# Patient Record
Sex: MALE | Race: WHITE | NOT HISPANIC OR LATINO | URBAN - METROPOLITAN AREA
[De-identification: names, ages, dates, MRNs, and addresses within clinical notes are randomized per-mention and may not be internally consistent; named-entity substitution may affect disease eponyms.]

---

## 2022-10-13 ENCOUNTER — INPATIENT (INPATIENT)
Facility: HOSPITAL | Age: 77
LOS: 1 days | Discharge: NURSING, REHAB OR LONG-TERM CARE | End: 2022-10-15
Attending: INTERNAL MEDICINE | Admitting: INTERNAL MEDICINE
Payer: MEDICARE

## 2022-10-13 VITALS
OXYGEN SATURATION: 97 % | TEMPERATURE: 98 F | HEART RATE: 78 BPM | DIASTOLIC BLOOD PRESSURE: 72 MMHG | RESPIRATION RATE: 17 BRPM | WEIGHT: 199.96 LBS | SYSTOLIC BLOOD PRESSURE: 141 MMHG

## 2022-10-13 LAB
ALBUMIN SERPL ELPH-MCNC: 4 G/DL — SIGNIFICANT CHANGE UP (ref 3.5–5.2)
ALP SERPL-CCNC: 64 U/L — SIGNIFICANT CHANGE UP (ref 30–115)
ALT FLD-CCNC: 12 U/L — SIGNIFICANT CHANGE UP (ref 0–41)
ANION GAP SERPL CALC-SCNC: 15 MMOL/L — HIGH (ref 7–14)
AST SERPL-CCNC: 17 U/L — SIGNIFICANT CHANGE UP (ref 0–41)
BASOPHILS # BLD AUTO: 0.04 K/UL — SIGNIFICANT CHANGE UP (ref 0–0.2)
BASOPHILS NFR BLD AUTO: 0.3 % — SIGNIFICANT CHANGE UP (ref 0–1)
BILIRUB SERPL-MCNC: 0.9 MG/DL — SIGNIFICANT CHANGE UP (ref 0.2–1.2)
BUN SERPL-MCNC: 20 MG/DL — SIGNIFICANT CHANGE UP (ref 10–20)
CALCIUM SERPL-MCNC: 9.4 MG/DL — SIGNIFICANT CHANGE UP (ref 8.4–10.5)
CHLORIDE SERPL-SCNC: 98 MMOL/L — SIGNIFICANT CHANGE UP (ref 98–110)
CO2 SERPL-SCNC: 22 MMOL/L — SIGNIFICANT CHANGE UP (ref 17–32)
CREAT SERPL-MCNC: 0.9 MG/DL — SIGNIFICANT CHANGE UP (ref 0.7–1.5)
EGFR: 89 ML/MIN/1.73M2 — SIGNIFICANT CHANGE UP
EOSINOPHIL # BLD AUTO: 0.07 K/UL — SIGNIFICANT CHANGE UP (ref 0–0.7)
EOSINOPHIL NFR BLD AUTO: 0.6 % — SIGNIFICANT CHANGE UP (ref 0–8)
ERYTHROCYTE [SEDIMENTATION RATE] IN BLOOD: 8 MM/HR — SIGNIFICANT CHANGE UP (ref 0–10)
GLUCOSE SERPL-MCNC: 136 MG/DL — HIGH (ref 70–99)
HCT VFR BLD CALC: 46 % — SIGNIFICANT CHANGE UP (ref 42–52)
HGB BLD-MCNC: 16.3 G/DL — SIGNIFICANT CHANGE UP (ref 14–18)
IMM GRANULOCYTES NFR BLD AUTO: 0.3 % — SIGNIFICANT CHANGE UP (ref 0.1–0.3)
LYMPHOCYTES # BLD AUTO: 2.46 K/UL — SIGNIFICANT CHANGE UP (ref 1.2–3.4)
LYMPHOCYTES # BLD AUTO: 20.4 % — LOW (ref 20.5–51.1)
MCHC RBC-ENTMCNC: 33.1 PG — HIGH (ref 27–31)
MCHC RBC-ENTMCNC: 35.4 G/DL — SIGNIFICANT CHANGE UP (ref 32–37)
MCV RBC AUTO: 93.5 FL — SIGNIFICANT CHANGE UP (ref 80–94)
MONOCYTES # BLD AUTO: 0.99 K/UL — HIGH (ref 0.1–0.6)
MONOCYTES NFR BLD AUTO: 8.2 % — SIGNIFICANT CHANGE UP (ref 1.7–9.3)
NEUTROPHILS # BLD AUTO: 8.48 K/UL — HIGH (ref 1.4–6.5)
NEUTROPHILS NFR BLD AUTO: 70.2 % — SIGNIFICANT CHANGE UP (ref 42.2–75.2)
NRBC # BLD: 0 /100 WBCS — SIGNIFICANT CHANGE UP (ref 0–0)
PLATELET # BLD AUTO: 293 K/UL — SIGNIFICANT CHANGE UP (ref 130–400)
POTASSIUM SERPL-MCNC: 4.5 MMOL/L — SIGNIFICANT CHANGE UP (ref 3.5–5)
POTASSIUM SERPL-SCNC: 4.5 MMOL/L — SIGNIFICANT CHANGE UP (ref 3.5–5)
PROT SERPL-MCNC: 7.2 G/DL — SIGNIFICANT CHANGE UP (ref 6–8)
RBC # BLD: 4.92 M/UL — SIGNIFICANT CHANGE UP (ref 4.7–6.1)
RBC # FLD: 12.6 % — SIGNIFICANT CHANGE UP (ref 11.5–14.5)
SARS-COV-2 RNA SPEC QL NAA+PROBE: SIGNIFICANT CHANGE UP
SODIUM SERPL-SCNC: 135 MMOL/L — SIGNIFICANT CHANGE UP (ref 135–146)
WBC # BLD: 12.08 K/UL — HIGH (ref 4.8–10.8)
WBC # FLD AUTO: 12.08 K/UL — HIGH (ref 4.8–10.8)

## 2022-10-13 PROCEDURE — 73552 X-RAY EXAM OF FEMUR 2/>: CPT | Mod: 26,LT

## 2022-10-13 PROCEDURE — 93010 ELECTROCARDIOGRAM REPORT: CPT

## 2022-10-13 PROCEDURE — 99285 EMERGENCY DEPT VISIT HI MDM: CPT | Mod: FS

## 2022-10-13 PROCEDURE — 72170 X-RAY EXAM OF PELVIS: CPT | Mod: 26

## 2022-10-13 NOTE — ED PROVIDER NOTE - OBJECTIVE STATEMENT
77-year old male with no significant past medical history who presented with no evidence of left leg, left hip, and back pain.  Reports that symptoms started 5 days ago and the pain has been progressively getting worse.  Reports that he has not been able to get out of bed for the past few days.  Reports that he has been urinating in the cup by the bedside, so he does not have to get out of bed to use the bathroom.  Reports that he has not been eating or drinking well for the past couple of days because he has not been able to get out of bed.  Denies recent trauma or injury or fall.  Denies saddle anesthesia, loss of bladder/bowel control.  Denies fever, shortness of breath, chest pain, nausea, vomiting, abdominal pain, urinary symptoms, change in bowel movement.

## 2022-10-13 NOTE — ED PROVIDER NOTE - ATTENDING APP SHARED VISIT CONTRIBUTION OF CARE
I was present for and supervised the key and critical aspects of the procedures performed during the care of the patient. Patient is a 77-year-old male no past medical history presents for evaluation of left hip pain as well and his left lower back pain onset over the past several weeks worsening over the past 5 days denies any fevers chills denies any trauma states that he has been not able to ambulate secondary to pain in fact on further questioning  patient is urinating into bedside cup he has decreased p.o. intake for the past several days secondary to the lack of ability to ambulate denies any abdominal pain vomiting diarrhea he denies any skin changes    On physical exam patient is normocephalic atraumatic pupils equal round reactive light accommodation extraocular muscles intact oropharynx clear chest clear to auscultation bilaterally abdomen soft nontender nondistended flank no CVA tenderness noted extremities radial pulses 2+ no edema pedal pulse 2+ cap refills normal patient has tenderness to palpation particularly upon flexion of the left hip as well as external and internal rotation of the left hip no signs of trauma no bruising    Assessment plan we obtained x-rays of the hip which reveal 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

## 2022-10-13 NOTE — ED PROVIDER NOTE - PHYSICAL EXAMINATION
CONSTITUTIONAL: in no apparent distress.   HEAD: Normocephalic; atraumatic.   ENT: Hearing is intact with good acuity to spoken voice.  Patient is speaking clearly, not muffled and airway is intact.   RESPIRATORY: No signs of respiratory distress. Lung sounds are clear in all lobes bilaterally without rales, rhonchi, or wheezes.  CARDIOVASCULAR: Regular rate and rhythm.   GI: Abdomen is soft, non-tender, and without distention.   BACK: No evidence of trauma or deformity. No midline tenderness. No CVA tenderness bilaterally. Normal ROM.   PELVIS: No evidence of trauma or deformity. L pelvis tender to palpation.  MS: Normal appearance and ROM in all four extremities. No tenderness to palpation and distal pulses are normal. Sensation to the upper and lower extremities is normal bilaterally.   NEURO: A & O x 3. Normal speech.   PSYCHOLOGICAL: Appropriate mood and affect. Good judgement and insight.

## 2022-10-13 NOTE — ED PROVIDER NOTE - NS ED ROS FT
Constitutional: Negative for fever, chills  HENT: Negative for headache,  Eyes: Negative for eye pain, and vision change.  Cardiovascular: Negative for chest pain, and palpitation.  Respiratory: Negative for SOB, wheezing, cough and sputum production.  Gastrointestinal: Negative for nausea, vomiting, abdominal pain  Genitourinary: Negative for flank pain, dysuria, frequency, and hematuria.  Neurological: Negative for dizziness, syncope, and loss of consciousness.  Musculoskeletal: + L leg, L hip, and back pain. Negative for neck pain, and calf cramps.  Hematological: Does not bruise/bleed easily.

## 2022-10-13 NOTE — ED PROVIDER NOTE - PROGRESS NOTE DETAILS
Labs and xray unremarkable. Pt is not able to ambulate. Will admit for ambulatory dysfunction. Pt is aware of the plan and agrees. Pt has been endorsed to hospitalist.

## 2022-10-13 NOTE — ED PROVIDER NOTE - CLINICAL SUMMARY MEDICAL DECISION MAKING FREE TEXT BOX
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

## 2022-10-14 LAB
ALBUMIN SERPL ELPH-MCNC: 4.4 G/DL — SIGNIFICANT CHANGE UP (ref 3.5–5.2)
ALP SERPL-CCNC: 73 U/L — SIGNIFICANT CHANGE UP (ref 30–115)
ALT FLD-CCNC: 13 U/L — SIGNIFICANT CHANGE UP (ref 0–41)
ANION GAP SERPL CALC-SCNC: 11 MMOL/L — SIGNIFICANT CHANGE UP (ref 7–14)
AST SERPL-CCNC: 17 U/L — SIGNIFICANT CHANGE UP (ref 0–41)
BILIRUB SERPL-MCNC: 0.7 MG/DL — SIGNIFICANT CHANGE UP (ref 0.2–1.2)
BUN SERPL-MCNC: 26 MG/DL — HIGH (ref 10–20)
CALCIUM SERPL-MCNC: 9.8 MG/DL — SIGNIFICANT CHANGE UP (ref 8.4–10.5)
CHLORIDE SERPL-SCNC: 97 MMOL/L — LOW (ref 98–110)
CO2 SERPL-SCNC: 26 MMOL/L — SIGNIFICANT CHANGE UP (ref 17–32)
CREAT SERPL-MCNC: 0.9 MG/DL — SIGNIFICANT CHANGE UP (ref 0.7–1.5)
EGFR: 89 ML/MIN/1.73M2 — SIGNIFICANT CHANGE UP
ERYTHROCYTE [SEDIMENTATION RATE] IN BLOOD: 59 MM/HR — HIGH (ref 0–10)
GLUCOSE SERPL-MCNC: 191 MG/DL — HIGH (ref 70–99)
HCT VFR BLD CALC: 47.7 % — SIGNIFICANT CHANGE UP (ref 42–52)
HGB BLD-MCNC: 16.7 G/DL — SIGNIFICANT CHANGE UP (ref 14–18)
MCHC RBC-ENTMCNC: 33.1 PG — HIGH (ref 27–31)
MCHC RBC-ENTMCNC: 35 G/DL — SIGNIFICANT CHANGE UP (ref 32–37)
MCV RBC AUTO: 94.5 FL — HIGH (ref 80–94)
NRBC # BLD: 0 /100 WBCS — SIGNIFICANT CHANGE UP (ref 0–0)
PLATELET # BLD AUTO: 321 K/UL — SIGNIFICANT CHANGE UP (ref 130–400)
POTASSIUM SERPL-MCNC: 4.1 MMOL/L — SIGNIFICANT CHANGE UP (ref 3.5–5)
POTASSIUM SERPL-SCNC: 4.1 MMOL/L — SIGNIFICANT CHANGE UP (ref 3.5–5)
PROT SERPL-MCNC: 7.6 G/DL — SIGNIFICANT CHANGE UP (ref 6–8)
RBC # BLD: 5.05 M/UL — SIGNIFICANT CHANGE UP (ref 4.7–6.1)
RBC # FLD: 12.7 % — SIGNIFICANT CHANGE UP (ref 11.5–14.5)
SODIUM SERPL-SCNC: 134 MMOL/L — LOW (ref 135–146)
WBC # BLD: 12.18 K/UL — HIGH (ref 4.8–10.8)
WBC # FLD AUTO: 12.18 K/UL — HIGH (ref 4.8–10.8)

## 2022-10-14 PROCEDURE — 99222 1ST HOSP IP/OBS MODERATE 55: CPT

## 2022-10-14 PROCEDURE — 73700 CT LOWER EXTREMITY W/O DYE: CPT | Mod: 26,LT

## 2022-10-14 RX ORDER — ONDANSETRON 8 MG/1
4 TABLET, FILM COATED ORAL EVERY 8 HOURS
Refills: 0 | Status: DISCONTINUED | OUTPATIENT
Start: 2022-10-14 | End: 2022-10-15

## 2022-10-14 RX ORDER — SODIUM CHLORIDE 9 MG/ML
1000 INJECTION INTRAMUSCULAR; INTRAVENOUS; SUBCUTANEOUS
Refills: 0 | Status: DISCONTINUED | OUTPATIENT
Start: 2022-10-14 | End: 2022-10-15

## 2022-10-14 RX ORDER — ACETAMINOPHEN 500 MG
650 TABLET ORAL EVERY 6 HOURS
Refills: 0 | Status: DISCONTINUED | OUTPATIENT
Start: 2022-10-14 | End: 2022-10-15

## 2022-10-14 RX ORDER — ENOXAPARIN SODIUM 100 MG/ML
40 INJECTION SUBCUTANEOUS EVERY 24 HOURS
Refills: 0 | Status: DISCONTINUED | OUTPATIENT
Start: 2022-10-14 | End: 2022-10-15

## 2022-10-14 RX ORDER — KETOROLAC TROMETHAMINE 30 MG/ML
30 SYRINGE (ML) INJECTION EVERY 6 HOURS
Refills: 0 | Status: DISCONTINUED | OUTPATIENT
Start: 2022-10-14 | End: 2022-10-15

## 2022-10-14 RX ADMIN — ENOXAPARIN SODIUM 40 MILLIGRAM(S): 100 INJECTION SUBCUTANEOUS at 12:04

## 2022-10-14 RX ADMIN — Medication 30 MILLIGRAM(S): at 23:20

## 2022-10-14 RX ADMIN — Medication 30 MILLIGRAM(S): at 14:01

## 2022-10-14 RX ADMIN — Medication 30 MILLIGRAM(S): at 15:26

## 2022-10-14 RX ADMIN — Medication 30 MILLIGRAM(S): at 22:08

## 2022-10-14 NOTE — PHYSICAL THERAPY INITIAL EVALUATION ADULT - FOLLOWS COMMANDS/ANSWERS QUESTIONS, REHAB EVAL
1. Have you had increased asthma symptoms (chest tightness, increased cough,         wheezing or felt short of breath) in the past week? No    2. Have you had a marked increase in allergy symptoms(itchy eyes or nose, sneezing, runny nose, post nasal drip or throat clearing) in the past week? Yes, runny nose    3. Do you have a cold or respiratory tract infection, or flu-like symptoms? No    4. Did you have any problems such as increased allergy or asthma symptoms, hives or generalized itching within 12 hours of receiving your allergy injection or swelling that persisted into the next day? No    5.Are you on any new medications? Any new eye drops? Any new blood pressure or migraine medications? Yes, diltiazem 360 daily and xarelto Dr. Cedillo spoke with Dr. Burrell on this per pt and all is ok to continue nucala treatment.    Please specify:     6. Are you taking your allergy medicine as prescribed? Yes    7. Do you have your Epi kit with you? Yes    Staff notes (intervention)     100% of the time/able to follow multistep instructions

## 2022-10-14 NOTE — ASU PATIENT PROFILE, ADULT - FALL HARM RISK - HARM RISK INTERVENTIONS
Assistance with ambulation/Assistance OOB with selected safe patient handling equipment/Communicate Risk of Fall with Harm to all staff/Discuss with provider need for PT consult/Monitor gait and stability/Provide patient with walking aids - walker, cane, crutches/Reinforce activity limits and safety measures with patient and family/Tailored Fall Risk Interventions/Use of alarms - bed, chair and/or voice tab/Visual Cue: Yellow wristband and red socks/Bed in lowest position, wheels locked, appropriate side rails in place/Call bell, personal items and telephone in reach/Instruct patient to call for assistance before getting out of bed or chair/Non-slip footwear when patient is out of bed/[place] to call system/Physically safe environment - no spills, clutter or unnecessary equipment/Purposeful Proactive Rounding/Room/bathroom lighting operational, light cord in reach

## 2022-10-14 NOTE — H&P ADULT - NSHPPHYSICALEXAM_GEN_ALL_CORE
VITALS:   T(C): 35.8 (10-14-22 @ 05:45), Max: 36.9 (10-13-22 @ 20:37)  HR: 73 (10-14-22 @ 05:45) (73 - 88)  BP: 131/73 (10-14-22 @ 05:45) (120/86 - 141/72)  RR: 18 (10-14-22 @ 05:45) (17 - 18)  SpO2: 96% (10-14-22 @ 05:45) (96% - 98%)    GENERAL: NAD, lying in bed comfortably  HEAD:  Atraumatic, Normocephalic  EYES: EOMI, conjunctiva and sclera clear  ENT: Moist mucous membranes  NECK: Supple, No JVD  CHEST/LUNG: CTA b/l, Unlabored respirations  HEART: Regular rate and rhythm; No murmurs, rubs, or gallops  ABDOMEN: Bowel sounds present; Soft, Nontender, Nondistended.   EXTREMITIES: No clubbing, cyanosis, or edema; ttp over left hip region, decreased ROM  NERVOUS SYSTEM:  Alert & Oriented X3, speech clear. No deficits   MSK: FROM all 4 extremities, full and equal strength  SKIN: No rashes or lesions

## 2022-10-14 NOTE — H&P ADULT - NS ATTEND AMEND GEN_ALL_CORE FT
Pt seen in the AM, CT left hip done-  IMPRESSION:  No acute osseous abnormality.  Left femoral gamma nail fixation of healed intertrochanteric fracture.  Severe osteoarthritis of the left hip.    Follow PT eval, discussed case with family who agrees to STR if patient qualifies.

## 2022-10-14 NOTE — H&P ADULT - ASSESSMENT
77-year old male with no significant past medical history presented to ED due to left hip and left leg pain.    # Left hip pain  # Inability to ambulate  - Xray pelvis with no acute findings  - pain meds  - ambulate as tolerated  - fall risk  - PT  -  77-year old male with no significant past medical history presented to ED due to left hip and left leg pain.    # Left hip pain  # Inability to ambulate  - Xray pelvis with no acute findings  - pain meds  - ambulate as tolerated  - fall risk  - PT  - ESR    # leukocytosis  - no sign of infection  - pt denies any home fevers/chills  - will monitor  77-year old male with no significant past medical history presented to ED due to left hip and left leg pain.    # Left hip pain  # Inability to ambulate  - Xray pelvis with no acute findings  - pain meds  - ambulate as tolerated  - fall risk  - PT  - ESR  - neuro consult    # leukocytosis  - no sign of infection  - pt denies any home fevers/chills  - repeat cbc  - will monitor  77-year old male with no significant past medical history presented to ED due to left hip and left leg pain.    # Left hip pain  # Inability to ambulate  - Xray pelvis with no acute findings  - pain meds  - ambulate as tolerated  - fall risk  - PT    # leukocytosis  - no sign of infection  - pt denies any home fevers/chills

## 2022-10-14 NOTE — H&P ADULT - HISTORY OF PRESENT ILLNESS
77-year old male with no significant past medical history who presented with no evidence of left leg, left hip, and back pain.  Reports that symptoms started 5 days ago and the pain has been progressively getting worse.  Reports that he has not been able to get out of bed for the past few days.  Reports that he has been urinating in the cup by the bedside, so he does not have to get out of bed to use the bathroom.  Reports that he has not been eating or drinking well for the past couple of days because he has not been able to get out of bed.  Denies recent trauma or injury or fall.  Denies saddle anesthesia, loss of bladder/bowel control.  Denies fever, shortness of breath, chest pain, nausea, vomiting, abdominal pain, urinary symptoms, change in bowel movement. 77-year old male with no significant past medical history presented to ED due to left hip and left leg pain. States it has been ongoing for the past week. Reports that symptoms  and the pain has been progressively getting worse.  Reports that he has not been able to get out of bed for the past few days.  Reports that he has been urinating in a cup by the bedside so he does not have to get out of bed to use the bathroom.  States that he has not been eating or drinking well for the past couple of days because he has not been able to get out of bed.  Denies recent trauma, injury, or fall, deines saddle anesthesia, loss of bladder/bowel control.  Denies fever, shortness of breath, chest pain, nausea, vomiting, abdominal pain, urinary symptoms, change in bowel movement.  Pt states he wynne not take any meds.

## 2022-10-14 NOTE — H&P ADULT - NSHPLABSRESULTS_GEN_ALL_CORE
LABS:  cret                        16.3   12.08 )-----------( 293      ( 13 Oct 2022 21:25 )             46.0     10-13    135  |  98  |  20  ----------------------------<  136<H>  4.5   |  22  |  0.9    Ca    9.4      13 Oct 2022 21:25    TPro  7.2  /  Alb  4.0  /  TBili  0.9  /  DBili  x   /  AST  17  /  ALT  12  /  AlkPhos  64  10-13      RADIOLOGY:    Xray Pelvis AP only (10.13.22 @ 21:56)     Impression:    No acute fracture or acute articular abnormality.    Chronic appearing deformity of the right inferior pubic ramus.    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

## 2022-10-14 NOTE — PHYSICAL THERAPY INITIAL EVALUATION ADULT - ADDITIONAL COMMENTS
pt lives alone in a private house with 5-6 steps to the front door with 1 rail, no steps inside.  Pt amb with SC prior to admission

## 2022-10-15 ENCOUNTER — TRANSCRIPTION ENCOUNTER (OUTPATIENT)
Age: 77
End: 2022-10-15

## 2022-10-15 VITALS
DIASTOLIC BLOOD PRESSURE: 73 MMHG | RESPIRATION RATE: 18 BRPM | SYSTOLIC BLOOD PRESSURE: 150 MMHG | HEART RATE: 66 BPM | TEMPERATURE: 98 F

## 2022-10-15 LAB
ALBUMIN SERPL ELPH-MCNC: 3.8 G/DL — SIGNIFICANT CHANGE UP (ref 3.5–5.2)
ALP SERPL-CCNC: 87 U/L — SIGNIFICANT CHANGE UP (ref 30–115)
ALT FLD-CCNC: 14 U/L — SIGNIFICANT CHANGE UP (ref 0–41)
ANION GAP SERPL CALC-SCNC: 10 MMOL/L — SIGNIFICANT CHANGE UP (ref 7–14)
AST SERPL-CCNC: 18 U/L — SIGNIFICANT CHANGE UP (ref 0–41)
BASOPHILS # BLD AUTO: 0.04 K/UL — SIGNIFICANT CHANGE UP (ref 0–0.2)
BASOPHILS NFR BLD AUTO: 0.4 % — SIGNIFICANT CHANGE UP (ref 0–1)
BILIRUB SERPL-MCNC: 0.4 MG/DL — SIGNIFICANT CHANGE UP (ref 0.2–1.2)
BUN SERPL-MCNC: 33 MG/DL — HIGH (ref 10–20)
CALCIUM SERPL-MCNC: 8.9 MG/DL — SIGNIFICANT CHANGE UP (ref 8.4–10.5)
CHLORIDE SERPL-SCNC: 100 MMOL/L — SIGNIFICANT CHANGE UP (ref 98–110)
CO2 SERPL-SCNC: 27 MMOL/L — SIGNIFICANT CHANGE UP (ref 17–32)
CREAT SERPL-MCNC: 1.1 MG/DL — SIGNIFICANT CHANGE UP (ref 0.7–1.5)
CRP SERPL-MCNC: 8 MG/L — HIGH
EGFR: 70 ML/MIN/1.73M2 — SIGNIFICANT CHANGE UP
EOSINOPHIL # BLD AUTO: 0.12 K/UL — SIGNIFICANT CHANGE UP (ref 0–0.7)
EOSINOPHIL NFR BLD AUTO: 1.3 % — SIGNIFICANT CHANGE UP (ref 0–8)
GLUCOSE SERPL-MCNC: 201 MG/DL — HIGH (ref 70–99)
HCT VFR BLD CALC: 41.1 % — LOW (ref 42–52)
HCV AB S/CO SERPL IA: 0.03 COI — SIGNIFICANT CHANGE UP
HCV AB SERPL-IMP: SIGNIFICANT CHANGE UP
HGB BLD-MCNC: 14.2 G/DL — SIGNIFICANT CHANGE UP (ref 14–18)
IMM GRANULOCYTES NFR BLD AUTO: 0.2 % — SIGNIFICANT CHANGE UP (ref 0.1–0.3)
LYMPHOCYTES # BLD AUTO: 2.01 K/UL — SIGNIFICANT CHANGE UP (ref 1.2–3.4)
LYMPHOCYTES # BLD AUTO: 21.8 % — SIGNIFICANT CHANGE UP (ref 20.5–51.1)
MCHC RBC-ENTMCNC: 33.4 PG — HIGH (ref 27–31)
MCHC RBC-ENTMCNC: 34.5 G/DL — SIGNIFICANT CHANGE UP (ref 32–37)
MCV RBC AUTO: 96.7 FL — HIGH (ref 80–94)
MONOCYTES # BLD AUTO: 0.77 K/UL — HIGH (ref 0.1–0.6)
MONOCYTES NFR BLD AUTO: 8.3 % — SIGNIFICANT CHANGE UP (ref 1.7–9.3)
NEUTROPHILS # BLD AUTO: 6.27 K/UL — SIGNIFICANT CHANGE UP (ref 1.4–6.5)
NEUTROPHILS NFR BLD AUTO: 68 % — SIGNIFICANT CHANGE UP (ref 42.2–75.2)
NRBC # BLD: 0 /100 WBCS — SIGNIFICANT CHANGE UP (ref 0–0)
PLATELET # BLD AUTO: 268 K/UL — SIGNIFICANT CHANGE UP (ref 130–400)
POTASSIUM SERPL-MCNC: 4.5 MMOL/L — SIGNIFICANT CHANGE UP (ref 3.5–5)
POTASSIUM SERPL-SCNC: 4.5 MMOL/L — SIGNIFICANT CHANGE UP (ref 3.5–5)
PROT SERPL-MCNC: 6.7 G/DL — SIGNIFICANT CHANGE UP (ref 6–8)
RBC # BLD: 4.25 M/UL — LOW (ref 4.7–6.1)
RBC # FLD: 12.8 % — SIGNIFICANT CHANGE UP (ref 11.5–14.5)
SODIUM SERPL-SCNC: 137 MMOL/L — SIGNIFICANT CHANGE UP (ref 135–146)
WBC # BLD: 9.23 K/UL — SIGNIFICANT CHANGE UP (ref 4.8–10.8)
WBC # FLD AUTO: 9.23 K/UL — SIGNIFICANT CHANGE UP (ref 4.8–10.8)

## 2022-10-15 PROCEDURE — 99238 HOSP IP/OBS DSCHRG MGMT 30/<: CPT

## 2022-10-15 RX ORDER — ACETAMINOPHEN 500 MG
2 TABLET ORAL
Qty: 0 | Refills: 0 | DISCHARGE
Start: 2022-10-15

## 2022-10-15 RX ADMIN — SODIUM CHLORIDE 75 MILLILITER(S): 9 INJECTION INTRAMUSCULAR; INTRAVENOUS; SUBCUTANEOUS at 11:32

## 2022-10-15 RX ADMIN — ENOXAPARIN SODIUM 40 MILLIGRAM(S): 100 INJECTION SUBCUTANEOUS at 11:32

## 2022-10-15 NOTE — DISCHARGE NOTE PROVIDER - CARE PROVIDER_API CALL
Janel Drake)  Internal Medicine  [address] [place] [address]  Phone: [phone]  Fax: [phone]  Follow Up Time:

## 2022-10-15 NOTE — DISCHARGE NOTE PROVIDER - HOSPITAL COURSE
77-year old male with no significant past medical history presented to ED due to left hip and left leg pain.    # Left hip pain  # Inability to ambulate  - Xray pelvis with no acute findings  - pain meds  - ambulate as tolerated  - fall risk  - CT left hip done-  IMPRESSION:  No acute osseous abnormality.  Left femoral gamma nail fixation of healed intertrochanteric fracture.  Severe osteoarthritis of the left hip.    DC to STR

## 2022-10-15 NOTE — DISCHARGE NOTE PROVIDER - NSDCCPCAREPLAN_GEN_ALL_CORE_FT
PRINCIPAL DISCHARGE DIAGNOSIS  Diagnosis: Ambulatory dysfunction  Assessment and Plan of Treatment: Due to left hip arthritis.

## 2022-10-15 NOTE — DISCHARGE NOTE NURSING/CASE MANAGEMENT/SOCIAL WORK - PATIENT PORTAL LINK FT
You can access the FollowMyHealth Patient Portal offered by [facility] by registering at the following website: [email or web address]. By joining [employer]’s FollowMyHealth portal, you will also be able to view your health information using other applications (apps) compatible with our system.

## 2022-10-20 DIAGNOSIS — D72.829 ELEVATED WHITE BLOOD CELL COUNT, UNSPECIFIED: ICD-10-CM

## 2022-10-20 DIAGNOSIS — M25.552 PAIN IN LEFT HIP: ICD-10-CM

## 2022-10-20 DIAGNOSIS — M16.12 UNILATERAL PRIMARY OSTEOARTHRITIS, LEFT HIP: ICD-10-CM

## 2023-02-01 NOTE — PHYSICAL THERAPY INITIAL EVALUATION ADULT - CRITERIA FOR SKILLED THERAPEUTIC INTERVENTIONS
P2Y12/PRU response resulting 45.     Per Dr. López, adequate response level to plavix, continue Plavix 75 mg QD as part of DAPT regimen. impairments found/functional limitations in following categories/risk reduction/prevention/rehab potential/therapy frequency/predicted duration of therapy intervention/anticipated equipment needs at discharge/anticipated discharge recommendation

## 2024-08-30 ENCOUNTER — INPATIENT (INPATIENT)
Facility: HOSPITAL | Age: 79
LOS: 3 days | Discharge: NURSING, REHAB OR LONG-TERM CARE | DRG: 871 | End: 2024-09-03
Attending: STUDENT IN AN ORGANIZED HEALTH CARE EDUCATION/TRAINING PROGRAM | Admitting: HOSPITALIST
Payer: MEDICARE

## 2024-08-30 VITALS
OXYGEN SATURATION: 96 % | TEMPERATURE: 98 F | DIASTOLIC BLOOD PRESSURE: 71 MMHG | SYSTOLIC BLOOD PRESSURE: 102 MMHG | HEIGHT: 67 IN | RESPIRATION RATE: 18 BRPM | WEIGHT: 145.06 LBS | HEART RATE: 119 BPM

## 2024-08-30 DIAGNOSIS — A41.9 SEPSIS, UNSPECIFIED ORGANISM: ICD-10-CM

## 2024-08-30 PROBLEM — Z78.9 OTHER SPECIFIED HEALTH STATUS: Chronic | Status: ACTIVE | Noted: 2022-10-14

## 2024-08-30 LAB
ALBUMIN SERPL ELPH-MCNC: 3.2 G/DL — LOW (ref 3.5–5.2)
ALP SERPL-CCNC: 80 U/L — SIGNIFICANT CHANGE UP (ref 30–115)
ALT FLD-CCNC: 11 U/L — SIGNIFICANT CHANGE UP (ref 0–41)
ANION GAP SERPL CALC-SCNC: 12 MMOL/L — SIGNIFICANT CHANGE UP (ref 7–14)
APTT BLD: 35.5 SEC — SIGNIFICANT CHANGE UP (ref 27–39.2)
AST SERPL-CCNC: 17 U/L — SIGNIFICANT CHANGE UP (ref 0–41)
BASOPHILS # BLD AUTO: 0.03 K/UL — SIGNIFICANT CHANGE UP (ref 0–0.2)
BASOPHILS NFR BLD AUTO: 0.2 % — SIGNIFICANT CHANGE UP (ref 0–1)
BILIRUB SERPL-MCNC: 0.8 MG/DL — SIGNIFICANT CHANGE UP (ref 0.2–1.2)
BUN SERPL-MCNC: 36 MG/DL — HIGH (ref 10–20)
CALCIUM SERPL-MCNC: 9.3 MG/DL — SIGNIFICANT CHANGE UP (ref 8.4–10.5)
CHLORIDE SERPL-SCNC: 99 MMOL/L — SIGNIFICANT CHANGE UP (ref 98–110)
CO2 SERPL-SCNC: 24 MMOL/L — SIGNIFICANT CHANGE UP (ref 17–32)
CREAT SERPL-MCNC: 0.8 MG/DL — SIGNIFICANT CHANGE UP (ref 0.7–1.5)
EGFR: 91 ML/MIN/1.73M2 — SIGNIFICANT CHANGE UP
EOSINOPHIL # BLD AUTO: 0 K/UL — SIGNIFICANT CHANGE UP (ref 0–0.7)
EOSINOPHIL NFR BLD AUTO: 0 % — SIGNIFICANT CHANGE UP (ref 0–8)
ETHANOL SERPL-MCNC: <10 MG/DL — SIGNIFICANT CHANGE UP
GLUCOSE BLDC GLUCOMTR-MCNC: 116 MG/DL — HIGH (ref 70–99)
GLUCOSE BLDC GLUCOMTR-MCNC: 120 MG/DL — HIGH (ref 70–99)
GLUCOSE BLDC GLUCOMTR-MCNC: 208 MG/DL — HIGH (ref 70–99)
GLUCOSE SERPL-MCNC: 260 MG/DL — HIGH (ref 70–99)
HCT VFR BLD CALC: 37.4 % — LOW (ref 42–52)
HGB BLD-MCNC: 12.6 G/DL — LOW (ref 14–18)
IMM GRANULOCYTES NFR BLD AUTO: 0.7 % — HIGH (ref 0.1–0.3)
INR BLD: 1.1 RATIO — SIGNIFICANT CHANGE UP (ref 0.65–1.3)
LACTATE SERPL-SCNC: 1.9 MMOL/L — SIGNIFICANT CHANGE UP (ref 0.7–2)
LACTATE SERPL-SCNC: 3 MMOL/L — HIGH (ref 0.7–2)
LYMPHOCYTES # BLD AUTO: 0.71 K/UL — LOW (ref 1.2–3.4)
LYMPHOCYTES # BLD AUTO: 4.1 % — LOW (ref 20.5–51.1)
MCHC RBC-ENTMCNC: 32.3 PG — HIGH (ref 27–31)
MCHC RBC-ENTMCNC: 33.7 G/DL — SIGNIFICANT CHANGE UP (ref 32–37)
MCV RBC AUTO: 95.9 FL — HIGH (ref 80–94)
MONOCYTES # BLD AUTO: 0.72 K/UL — HIGH (ref 0.1–0.6)
MONOCYTES NFR BLD AUTO: 4.2 % — SIGNIFICANT CHANGE UP (ref 1.7–9.3)
NEUTROPHILS # BLD AUTO: 15.67 K/UL — HIGH (ref 1.4–6.5)
NEUTROPHILS NFR BLD AUTO: 90.8 % — HIGH (ref 42.2–75.2)
NRBC # BLD: 0 /100 WBCS — SIGNIFICANT CHANGE UP (ref 0–0)
PLATELET # BLD AUTO: 333 K/UL — SIGNIFICANT CHANGE UP (ref 130–400)
PMV BLD: 9.3 FL — SIGNIFICANT CHANGE UP (ref 7.4–10.4)
POTASSIUM SERPL-MCNC: 4 MMOL/L — SIGNIFICANT CHANGE UP (ref 3.5–5)
POTASSIUM SERPL-SCNC: 4 MMOL/L — SIGNIFICANT CHANGE UP (ref 3.5–5)
PROT SERPL-MCNC: 6.2 G/DL — SIGNIFICANT CHANGE UP (ref 6–8)
PROTHROM AB SERPL-ACNC: 12.5 SEC — SIGNIFICANT CHANGE UP (ref 9.95–12.87)
RBC # BLD: 3.9 M/UL — LOW (ref 4.7–6.1)
RBC # FLD: 13.3 % — SIGNIFICANT CHANGE UP (ref 11.5–14.5)
SODIUM SERPL-SCNC: 135 MMOL/L — SIGNIFICANT CHANGE UP (ref 135–146)
WBC # BLD: 17.25 K/UL — HIGH (ref 4.8–10.8)
WBC # FLD AUTO: 17.25 K/UL — HIGH (ref 4.8–10.8)

## 2024-08-30 PROCEDURE — 80061 LIPID PANEL: CPT

## 2024-08-30 PROCEDURE — 83540 ASSAY OF IRON: CPT

## 2024-08-30 PROCEDURE — 0241U: CPT

## 2024-08-30 PROCEDURE — 82728 ASSAY OF FERRITIN: CPT

## 2024-08-30 PROCEDURE — 82746 ASSAY OF FOLIC ACID SERUM: CPT

## 2024-08-30 PROCEDURE — 36415 COLL VENOUS BLD VENIPUNCTURE: CPT

## 2024-08-30 PROCEDURE — 92526 ORAL FUNCTION THERAPY: CPT | Mod: GN

## 2024-08-30 PROCEDURE — 80053 COMPREHEN METABOLIC PANEL: CPT

## 2024-08-30 PROCEDURE — 85027 COMPLETE CBC AUTOMATED: CPT

## 2024-08-30 PROCEDURE — 84443 ASSAY THYROID STIM HORMONE: CPT

## 2024-08-30 PROCEDURE — 99285 EMERGENCY DEPT VISIT HI MDM: CPT

## 2024-08-30 PROCEDURE — 92610 EVALUATE SWALLOWING FUNCTION: CPT | Mod: GN

## 2024-08-30 PROCEDURE — 85025 COMPLETE CBC W/AUTO DIFF WBC: CPT

## 2024-08-30 PROCEDURE — 71045 X-RAY EXAM CHEST 1 VIEW: CPT | Mod: 26

## 2024-08-30 PROCEDURE — 93010 ELECTROCARDIOGRAM REPORT: CPT

## 2024-08-30 PROCEDURE — 84145 PROCALCITONIN (PCT): CPT

## 2024-08-30 PROCEDURE — 97162 PT EVAL MOD COMPLEX 30 MIN: CPT | Mod: GP

## 2024-08-30 PROCEDURE — 87641 MR-STAPH DNA AMP PROBE: CPT

## 2024-08-30 PROCEDURE — 72125 CT NECK SPINE W/O DYE: CPT | Mod: 26,MC

## 2024-08-30 PROCEDURE — 87640 STAPH A DNA AMP PROBE: CPT

## 2024-08-30 PROCEDURE — 70450 CT HEAD/BRAIN W/O DYE: CPT | Mod: 26,MC

## 2024-08-30 PROCEDURE — 82962 GLUCOSE BLOOD TEST: CPT

## 2024-08-30 PROCEDURE — 83036 HEMOGLOBIN GLYCOSYLATED A1C: CPT

## 2024-08-30 PROCEDURE — 72170 X-RAY EXAM OF PELVIS: CPT | Mod: 26

## 2024-08-30 PROCEDURE — 99222 1ST HOSP IP/OBS MODERATE 55: CPT

## 2024-08-30 PROCEDURE — 83735 ASSAY OF MAGNESIUM: CPT

## 2024-08-30 PROCEDURE — 74177 CT ABD & PELVIS W/CONTRAST: CPT | Mod: 26,MC

## 2024-08-30 PROCEDURE — 71275 CT ANGIOGRAPHY CHEST: CPT | Mod: 26,MC

## 2024-08-30 PROCEDURE — 84100 ASSAY OF PHOSPHORUS: CPT

## 2024-08-30 PROCEDURE — 83550 IRON BINDING TEST: CPT

## 2024-08-30 PROCEDURE — 80048 BASIC METABOLIC PNL TOTAL CA: CPT

## 2024-08-30 PROCEDURE — 82607 VITAMIN B-12: CPT

## 2024-08-30 RX ORDER — GLUCAGON INJECTION, SOLUTION 1 MG/.2ML
1 INJECTION, SOLUTION SUBCUTANEOUS ONCE
Refills: 0 | Status: DISCONTINUED | OUTPATIENT
Start: 2024-08-30 | End: 2024-09-03

## 2024-08-30 RX ORDER — DEXTROSE 15 G/33 G
12.5 GEL IN PACKET (GRAM) ORAL ONCE
Refills: 0 | Status: DISCONTINUED | OUTPATIENT
Start: 2024-08-30 | End: 2024-09-03

## 2024-08-30 RX ORDER — VANCOMYCIN/0.9 % SOD CHLORIDE 1.75G/25
1000 PLASTIC BAG, INJECTION (ML) INTRAVENOUS ONCE
Refills: 0 | Status: COMPLETED | OUTPATIENT
Start: 2024-08-30 | End: 2024-08-30

## 2024-08-30 RX ORDER — ACETAMINOPHEN 325 MG/1
650 TABLET ORAL ONCE
Refills: 0 | Status: COMPLETED | OUTPATIENT
Start: 2024-08-30 | End: 2024-08-30

## 2024-08-30 RX ORDER — MAGNESIUM, ALUMINUM HYDROXIDE 200-225/5
30 SUSPENSION, ORAL (FINAL DOSE FORM) ORAL EVERY 4 HOURS
Refills: 0 | Status: DISCONTINUED | OUTPATIENT
Start: 2024-08-30 | End: 2024-09-03

## 2024-08-30 RX ORDER — DEXTROSE 15 G/33 G
25 GEL IN PACKET (GRAM) ORAL ONCE
Refills: 0 | Status: DISCONTINUED | OUTPATIENT
Start: 2024-08-30 | End: 2024-09-03

## 2024-08-30 RX ORDER — ACETAMINOPHEN 325 MG/1
650 TABLET ORAL EVERY 6 HOURS
Refills: 0 | Status: DISCONTINUED | OUTPATIENT
Start: 2024-08-30 | End: 2024-09-03

## 2024-08-30 RX ORDER — CEFEPIME 2 G/1
2000 INJECTION, POWDER, FOR SOLUTION INTRAVENOUS ONCE
Refills: 0 | Status: COMPLETED | OUTPATIENT
Start: 2024-08-30 | End: 2024-08-30

## 2024-08-30 RX ORDER — ENOXAPARIN SODIUM 100 MG/ML
40 INJECTION SUBCUTANEOUS EVERY 24 HOURS
Refills: 0 | Status: DISCONTINUED | OUTPATIENT
Start: 2024-08-30 | End: 2024-09-03

## 2024-08-30 RX ORDER — AZITHROMYCIN 500 MG/1
500 TABLET, FILM COATED ORAL DAILY
Refills: 0 | Status: COMPLETED | OUTPATIENT
Start: 2024-08-30 | End: 2024-09-02

## 2024-08-30 RX ORDER — ONDANSETRON 2 MG/ML
4 INJECTION, SOLUTION INTRAMUSCULAR; INTRAVENOUS EVERY 8 HOURS
Refills: 0 | Status: DISCONTINUED | OUTPATIENT
Start: 2024-08-30 | End: 2024-09-03

## 2024-08-30 RX ORDER — DEXTROSE 15 G/33 G
15 GEL IN PACKET (GRAM) ORAL ONCE
Refills: 0 | Status: DISCONTINUED | OUTPATIENT
Start: 2024-08-30 | End: 2024-09-03

## 2024-08-30 RX ADMIN — AZITHROMYCIN 255 MILLIGRAM(S): 500 TABLET, FILM COATED ORAL at 21:07

## 2024-08-30 RX ADMIN — Medication 100 MILLIGRAM(S): at 16:43

## 2024-08-30 RX ADMIN — ACETAMINOPHEN 650 MILLIGRAM(S): 325 TABLET ORAL at 14:22

## 2024-08-30 RX ADMIN — CEFEPIME 100 MILLIGRAM(S): 2 INJECTION, POWDER, FOR SOLUTION INTRAVENOUS at 12:26

## 2024-08-30 RX ADMIN — Medication 250 MILLIGRAM(S): at 14:09

## 2024-08-30 RX ADMIN — ENOXAPARIN SODIUM 40 MILLIGRAM(S): 100 INJECTION SUBCUTANEOUS at 21:07

## 2024-08-30 RX ADMIN — Medication 2000 MILLILITER(S): at 13:49

## 2024-08-30 RX ADMIN — Medication 2: at 16:49

## 2024-08-30 RX ADMIN — Medication 2000 MILLILITER(S): at 11:53

## 2024-08-30 NOTE — ED PROVIDER NOTE - EKG/XRAY ADDITIONAL INFORMATION
Normal sinus rhythm at 100 bpm, TX interval 188, , QTc 492, no ST elevations or depressions. RBBB. Normal sinus rhythm at 100 bpm, WA interval 188, , QTc 492, no ST elevations or depressions. RBBB.  multiple opacities Normal sinus rhythm at 100 bpm, PA interval 188, , QTc 492, no ST elevations or depressions. RBBB.  multiple opacities    no ich  no fracture

## 2024-08-30 NOTE — H&P ADULT - ASSESSMENT
80yo M with no known pmhx brought in by EMS after fall. Workup revealed mass like consolidation in the right lung concerning for neoplasm versus pneumonia. Given his productive cough and WBC, reasonable to continue treatment for pneumonia. Will switch to ceftriaxone/Azithro as patient presented from home and not frequently in healthcare setting. Plan to consult Pulmonology to weigh in as opacity is also concerning for potential neoplasm.    #Fall  - likely mechanical in nature. low concern for syncope.  - PT eval    #CAP  #Mass like consolidation concerning for malignancy  #Acute Hypoxia respiratory failure  - Continue supplemental O2, Ween as able.  - Will switch Ceftriaxone/Azithromycin  - Check sputum culture, blood culture, COVID/Flu Swap  - Send Legionella culture  - Add on Procal   - Consult Pulm to discuss further workup for potential malignancy  - Supportive care with Tylenol    #Hyperglycemia  - Check A1C  - Place on insulin sliding scale for now  78yo M with no known pmhx brought in by EMS after fall. Workup revealed mass like consolidation in the right lung concerning for neoplasm versus pneumonia. Given his productive cough and WBC, reasonable to continue treatment for pneumonia. Will switch to Ceftriaxone/Azithro as patient presented from home and not frequently in healthcare setting. Plan to consult Pulmonology to weigh in as opacity is also concerning for potential neoplasm.    #Fall  - likely mechanical in nature. low concern for syncope.  - PT eval    #CAP  #Mass like RLL consolidation concerning for malignancy  #Acute Hypoxemic respiratory failure  - Continue supplemental O2, wean as able.  - Albuterol prn   - Will switch to Ceftriaxone/Azithromycin  - Check sputum culture, blood culture, COVID/Flu Swab  - Send Legionella culture, strep pneumo   - Add on Procal   - Consult Pulm to discuss further workup for potential malignancy  - Supportive care with Tylenol  - SLP eval    #Hyperglycemia  - Check A1C  - Place on insulin sliding scale for now  78yo M with no known pmhx brought in by EMS after fall. Workup revealed mass like consolidation in the right lung concerning for neoplasm versus pneumonia. Given his productive cough and WBC, reasonable to continue treatment for pneumonia. Will switch to Ceftriaxone/Azithro as patient presented from home and not frequently in healthcare setting. Plan to consult Pulmonology to weigh in as opacity is also concerning for potential neoplasm.    #Fall  - likely mechanical in nature. low concern for syncope.  - PT eval    #CAP  #Mass like RLL consolidation concerning for malignancy  #Acute Hypoxemic respiratory failure  - Continue supplemental O2, wean as able.  - Albuterol prn   - Will switch to Ceftriaxone/Azithromycin  - Check sputum culture, blood culture, COVID/Flu Swab  - Send Legionella culture, strep pneumo   - Add on Procal   - Consult Pulm to discuss further workup for potential malignancy  - Supportive care with Tylenol  - SLP eval    #Hyperglycemia  - Check A1C  - Place on insulin sliding scale for now     # HCM  - pt w/ limited medical contact, age appropriate screening

## 2024-08-30 NOTE — ED PROVIDER NOTE - WHICH SHOWED
Normal sinus rhythm at 100 bpm, NC interval 188, , QTc 492, no ST elevations or depressions. Normal sinus rhythm at 100 bpm, DE interval 188, , QTc 492, no ST elevations or depressions.    multiple opacities on cxr and ct Normal sinus rhythm at 100 bpm, MO interval 188, , QTc 492, no ST elevations or depressions.    multiple opacities on cxr and ct  no ich  no fx

## 2024-08-30 NOTE — ED PROVIDER NOTE - CLINICAL SUMMARY MEDICAL DECISION MAKING FREE TEXT BOX
77-year-old male with no known history as patient does not follow-up with physicians, brought in by EMS for fall off recliner around 10:10 AM.  Per daughter her landlord heard a thump around 10:10 AM, checked on him at 10:20 AM, was found near his recliner on the wooden floor.  Patient states that he did fall but denies hitting his head or any symptoms.  Per daughter patient lives alone and does not like getting help afrom anybody, is unable to take care of himself, and is unkept.  Patient found to have stool all over body, nails, face.  Daughter states that this is not the first time this happened and that patient needs placement.  Noted to be febrile and tachycardic in the ED, sepsis workup initiated.  Pt denies head trauma, no loc, not on any AC. Deneis fever, chills, n/v, cp,  pleuritic cp, sob, palpitations, diaphoresis, cough, chest wall/rib pain, clavicular pain, ankle pain, knee pain, shoulder pain, wrist pain, no back pain, no hip pain, no ha/lh/dizziness, numbness/tingling, neck pain/ stiffness, abd pain,  melena/brbpr, urinary symptoms, edema, calf pain/swelling/erythema, sick contacts, recent travel . GCS 15.    On Exam:  Vital Signs: I have reviewed the initial vital signs.  Constitutional: Disheveled pt laying on stretcher.   HEAD: No signs of basilar skull fracture.  Integumentary: No rash. No laceration, abrasions, ecchymoses or swelling. Scaral ulcer and wall as upper back ulcer. purplish in color. no odor or discharge.   EYES: No periorbital swelling/ecchymoses. EOM intact. No nystagmus. No subconjunctival hemorrhage.   ENT: MMM. No rhinorrhea/otorrhea. No epistaxis,  No septal hematoma. No mastoid ecchymoses. No intraoral bleeding, No loose or cracked teeth, no active bleeding. No malocclusion. No TMJ pain.  NECK: Supple, non-tender, No palpable shelves or step-offs.  BACK: No spinous tenderness. No palpable shelves or step-offs.  Cardiovascular: Tachy, radial pulses 2/4 b/l. dp and pt pulses 2/4/ b/l. No pain to palpation to chest wall.  Respiratory: BS present b/l, ctabl, no wheezing or crackles, no stridor. No pain to palpation to ribs b/l. No crepitus. No subq emphysema.   Gastrointestinal: BS present throughout all 4 quadrants, soft, nd, nt. no r/g.  Musculoskeletal: FROM of all extremities. No bony tenderness. No pain to palpation to clavicles, no obvious bony deformities. No hip pain. No short leg. No internal or external rotation of LE  Neurologic: GCS 15. CN II-XII intact, no facial droop or slurring of speech. Motor 5/5 and sensation intact throughout upper and lower extremities.    Plan: Monitor, EKG, CXR, pelvic xray, labs, urine, ivf, antibiotics, anti pyretic, pan scan, reassess.    Labs and EKG were ordered and reviewed.  Imaging was ordered and reviewed by me.  Appropriate medications for patient's presenting complaints were ordered and effects were reassessed.  Patient's records (prior hospital, ED visit) were reviewed.  Additional history was obtained from EMS and daughter. Escalation to admission/observation was considered. Patient requires inpatient hospitalization - monitored setting. 77-year-old male with no known history as patient does not follow-up with physicians, brought in by EMS for fall off recliner around 10:10 AM.  Per daughter her landlord heard a thump around 10:10 AM, checked on him at 10:20 AM, was found near his recliner on the wooden floor.  Patient states that he did fall but denies hitting his head or any symptoms.  Per daughter patient lives alone and does not like getting help afrom anybody, is unable to take care of himself, and is unkept.  Patient found to have stool all over body, nails, face.  Daughter states that this is not the first time this happened and that patient needs placement.  Noted to be febrile and tachycardic in the ED, sepsis workup initiated.  Pt denies head trauma, no loc, not on any AC. Deneis fever, chills, n/v, cp,  pleuritic cp, sob, palpitations, diaphoresis, cough, chest wall/rib pain, clavicular pain, ankle pain, knee pain, shoulder pain, wrist pain, no back pain, no hip pain, no ha/lh/dizziness, numbness/tingling, neck pain/ stiffness, abd pain,  melena/brbpr, urinary symptoms, edema, calf pain/swelling/erythema, sick contacts, recent travel . GCS 15.    On Exam:  Vital Signs: I have reviewed the initial vital signs.  Constitutional: Disheveled pt laying on stretcher.   HEAD: No signs of basilar skull fracture.  Integumentary: No rash. No laceration, abrasions, ecchymoses or swelling. Scaral ulcer and wall as upper back ulcer. purplish in color. no odor or discharge.   EYES: No periorbital swelling/ecchymoses. EOM intact. No nystagmus. No subconjunctival hemorrhage.   ENT: MMM. No rhinorrhea/otorrhea. No epistaxis,  No septal hematoma. No mastoid ecchymoses. No intraoral bleeding, No loose or cracked teeth, no active bleeding. No malocclusion. No TMJ pain.  NECK: Supple, non-tender, No palpable shelves or step-offs.  BACK: No spinous tenderness. No palpable shelves or step-offs.  Cardiovascular: Tachy, radial pulses 2/4 b/l. dp and pt pulses 2/4/ b/l. No pain to palpation to chest wall.  Respiratory: BS present b/l, ctabl, no wheezing or crackles, no stridor. No pain to palpation to ribs b/l. No crepitus. No subq emphysema.   Gastrointestinal: BS present throughout all 4 quadrants, soft, nd, nt. no r/g.  Musculoskeletal: FROM of all extremities. No bony tenderness. No pain to palpation to clavicles, no obvious bony deformities. No hip pain. No short leg. No internal or external rotation of LE  Neurologic: GCS 15. CN II-XII intact, no facial droop or slurring of speech. Motor 5/5 and sensation intact throughout upper and lower extremities.    Plan: Monitor, EKG, CXR, pelvic xray, labs, urine, ivf, antibiotics, anti pyretic, pan scan, reassess.    Labs and EKG were ordered and reviewed.  Imaging was ordered and reviewed by me.  Appropriate medications for patient's presenting complaints were ordered and effects were reassessed.  Patient's records (prior hospital, ED visit) were reviewed.  Additional history was obtained from EMS and daughter. Escalation to admission/observation was considered. Patient requires inpatient hospitalization - monitored setting. Extensive conversation had with daughter, states pt cannot live alone and needs placement, reqeusting social work on admission.  I have fully discussed the medical management and delivery of care with the patient and family. I have discussed any available labs, imaging and treatment options .  Pt admitted for further care & management.

## 2024-08-30 NOTE — ED ADULT NURSE NOTE - NSFALLHARMRISKINTERV_ED_ALL_ED

## 2024-08-30 NOTE — PHYSICAL THERAPY INITIAL EVALUATION ADULT - GENERAL OBSERVATIONS, REHAB EVAL
17:50-18:15. Chart reviewed; confirmed with RN to see the pt for PT. Pt ready for PT; received in bed with no complain of pain and in NAD. +IV L UE, +O2 via NC at 3L. Agreeable for PT evaluation.

## 2024-08-30 NOTE — PHYSICAL THERAPY INITIAL EVALUATION ADULT - PERTINENT HX OF CURRENT PROBLEM, REHAB EVAL
78yo M with no known pmhx brought in by EMS after fall. Pt states he fell out of his recliner but currently has no pain. He is AOx2 and limited historian. Reports he was in his usual state of health prior to his fall . He lives at home alone and states he takes no medications. Currently endorsing a productive cough of (Sputum). Patient denies any chest pain, fever, nausea, vomiting, night sweats. Former smoker of 2 years. (occupation). No recent travel or sick contact.      In the ED, patient was hypoxic noted to be 90% on RA, was placed on 3L nasal cannula, and his O2 improved to 95%. Labs notable for leukocytosis of 17 and lactate of 3.0. CT Head/Spine negative. CT Chest is significant for upper and lower lobe reticulonodular and consolidative opacities.  Masslike opacity within the right lower lobe measuring up to 4.7 cm.  Enlarged right hilar lymph nodes. Differential includes neoplastic versus infectious etiologies. Patient was given vancomycin and cefepime due to suspicion of PNA.

## 2024-08-30 NOTE — ED PROVIDER NOTE - PHYSICAL EXAMINATION
CONST: Disheveled thin male  EYES: PERRL, EOMI, Sclera and conjunctiva clear.   ENT: No nasal discharge. Oropharynx normal appearing  NECK: Non-tender, no meningeal signs. normal ROM. supple   CARD: Normal S1 S2; Normal rate and rhythm  RESP: Equal BS B/L, No wheezes, rhonchi or rales. No distress  GI: Soft, non-tender, non-distended. no cva tenderness. normal BS  MS: Normal ROM in all extremities. No midline spinal tenderness. pulses 2 +. no calf tenderness or swelling  SKIN: sacral ulcer noted, Warm, dry, no acute rashes. Good turgor  NEURO: A&Ox2 (baseline as per patients daughter), No focal deficits. Strength 5/5 with no sensory deficits.

## 2024-08-30 NOTE — PATIENT PROFILE ADULT - HOME ACCESSIBILITY CONCERNS - OTHER
"Chief Complaint   Patient presents with     Port Draw     port accessed and labs drawn by rn.  vs taken.     Port accessed with 20g 3/4\" gripper needle, labs drawn, port flushed with saline and heparin, vitals checked.  Sujata Mosquera RN    "
"Oncology Rooming Note    December 7, 2017 10:45 AM   Anthony Carbone is a 75 year old male who presents for:    Chief Complaint   Patient presents with     Port Draw     port accessed and labs drawn by rn.  vs taken.     Oncology Clinic Visit     @ wk f/u Multiple Myeloma     Initial Vitals: /67 (BP Location: Right arm, Patient Position: Sitting, Cuff Size: Adult Regular)  Pulse 95  Temp 98.4  F (36.9  C) (Oral)  Resp 16  Ht 1.626 m (5' 4.02\")  Wt 53 kg (116 lb 12.8 oz)  SpO2 98%  BMI 20.04 kg/m2 Estimated body mass index is 20.04 kg/(m^2) as calculated from the following:    Height as of this encounter: 1.626 m (5' 4.02\").    Weight as of this encounter: 53 kg (116 lb 12.8 oz). Body surface area is 1.55 meters squared.  No Pain (0) Comment: Data Unavailable   No LMP for male patient.  Allergies reviewed: Yes  Medications reviewed: Yes    Medications: MEDICATION REFILLS NEEDED TODAY. Provider was notified.  Pharmacy name entered into [facility]:    [id number] - [place], [place] - [address] AT [facility] & [facility] [address]  [facility] [facility] [place], [place] - [address] [facility]  [facility] [place], [place] - [address] AT [facility] [address]    Clinical concerns: none Leanne was NOT notified.    10 minutes for nursing intake (face to face time)     MATTHEW KRAMER LPN            "
pt cant take care of themselves

## 2024-08-30 NOTE — ED PROVIDER NOTE - CARE PLAN
1 Principal Discharge DX:	Sepsis  Secondary Diagnosis:	Pneumonia  Secondary Diagnosis:	Frequent falls

## 2024-08-30 NOTE — ED PROVIDER NOTE - ATTENDING APP SHARED VISIT CONTRIBUTION OF CARE
77-year-old male with no known history as patient does not follow-up with physicians, brought in by EMS for fall off recliner around 10:10 AM.  Per daughter her landlord heard a thump around 10:10 AM, checked on him at 10:20 AM, was found near his recliner on the wooden floor.  Patient states that he did fall but denies hitting his head or any symptoms.  Per daughter patient lives alone and does not like getting help afrom anybody, is unable to take care of himself, and is unkept.  Patient found to have stool all over body, nails, face.  Daughter states that this is not the first time this happened and that patient needs placement.  Noted to be febrile and tachycardic in the ED, sepsis workup initiated.  Pt denies head trauma, no loc, not on any AC. Deneis fever, chills, n/v, cp,  pleuritic cp, sob, palpitations, diaphoresis, cough, chest wall/rib pain, clavicular pain, ankle pain, knee pain, shoulder pain, wrist pain, no back pain, no hip pain, no ha/lh/dizziness, numbness/tingling, neck pain/ stiffness, abd pain,  melena/brbpr, urinary symptoms, edema, calf pain/swelling/erythema, sick contacts, recent travel . GCS 15.    On Exam:  Vital Signs: I have reviewed the initial vital signs.  Constitutional: Disheveled pt laying on stretcher.   HEAD: No signs of basilar skull fracture.  Integumentary: No rash. No laceration, abrasions, ecchymoses or swelling. Scaral ulcer and wall as upper back ulcer. purplish in color. no odor or discharge.   EYES: No periorbital swelling/ecchymoses. EOM intact. No nystagmus. No subconjunctival hemorrhage.   ENT: MMM. No rhinorrhea/otorrhea. No epistaxis,  No septal hematoma. No mastoid ecchymoses. No intraoral bleeding, No loose or cracked teeth, no active bleeding. No malocclusion. No TMJ pain.  NECK: Supple, non-tender, No palpable shelves or step-offs.  BACK: No spinous tenderness. No palpable shelves or step-offs.  Cardiovascular: Tachy, radial pulses 2/4 b/l. dp and pt pulses 2/4/ b/l. No pain to palpation to chest wall.  Respiratory: BS present b/l, ctabl, no wheezing or crackles, no stridor. No pain to palpation to ribs b/l. No crepitus. No subq emphysema.   Gastrointestinal: BS present throughout all 4 quadrants, soft, nd, nt. no r/g.  Musculoskeletal: FROM of all extremities. No bony tenderness. No pain to palpation to clavicles, no obvious bony deformities. No hip pain. No short leg. No internal or external rotation of LE  Neurologic: GCS 15. CN II-XII intact, no facial droop or slurring of speech. Motor 5/5 and sensation intact throughout upper and lower extremities.    Plan: Monitor, EKG, CXR, pelvic xray, labs, urine, ivf, antibiotics, anti pyretic, pan scan, reassess.

## 2024-08-30 NOTE — ED ADULT TRIAGE NOTE - CHIEF COMPLAINT QUOTE
BIBA, as per EMS "dtr states frequent falls within 5 days. Ian heard pt fall, called 911, defecated on himself at home"

## 2024-08-30 NOTE — H&P ADULT - NSHPPHYSICALEXAM_GEN_ALL_CORE
VITALS:   T(C): 37.7 (08-30-24 @ 14:25), Max: 38.1 (08-30-24 @ 11:45)  HR: 92 (08-30-24 @ 14:04) (91 - 119)  BP: 114/66 (08-30-24 @ 14:04) (102/68 - 119/59)  RR: 18 (08-30-24 @ 14:04) (18 - 19)  SpO2: 94% (08-30-24 @ 14:04) (90% - 96%)    GENERAL: NAD, lying in bed comfortably  HEAD:  Atraumatic, Normocephalic  EYES: EOMI, PERRLA, conjunctiva and sclera clear  ENT: Moist mucous membranes  NECK: Supple, No JVD  CHEST/LUNG: Clear to auscultation bilaterally; No rales, rhonchi, wheezing, or rubs. Unlabored respirations  HEART: Regular rate and rhythm; No murmurs, rubs, or gallops  ABDOMEN: BSx4; Soft, nontender, nondistended  EXTREMITIES:  2+ Peripheral Pulses, brisk capillary refill. No clubbing, cyanosis, or edema  NERVOUS SYSTEM:  A&Ox3, no focal deficits   SKIN: No rashes or lesions VITALS:   T(C): 37.7 (08-30-24 @ 14:25), Max: 38.1 (08-30-24 @ 11:45)  HR: 92 (08-30-24 @ 14:04) (91 - 119)  BP: 114/66 (08-30-24 @ 14:04) (102/68 - 119/59)  RR: 18 (08-30-24 @ 14:04) (18 - 19)  SpO2: 94% (08-30-24 @ 14:04) (90% - 96%)    GENERAL: NAD, lying in bed comfortably, poor hygeine  HEAD:  Atraumatic, Normocephalic  EYES: EOMI, PERRLA, conjunctiva and sclera clear  ENT: Moist mucous membranes  NECK: Supple, No JVD  CHEST/LUNG:  Rhonchi in both lungs, crackles at RLL. Unlabored respirations  HEART: Regular rate and rhythm; No murmurs, rubs, or gallops  ABDOMEN: BSx4; Soft, nontender, nondistended  EXTREMITIES:  2+ Peripheral Pulses, brisk capillary refill. No clubbing, cyanosis, or edema  NERVOUS SYSTEM:  A&Ox3, no focal deficits   SKIN: No rashes or lesions

## 2024-08-30 NOTE — ED PROVIDER NOTE - DIFFERENTIAL DIAGNOSIS
Differential Diagnosis The differential diagnosis for patients clinical presentation includes but is not limited to:  sepsis, uti, fracture, ich, metabolic vs infectious etiology, arrythmia , bacteremia, osteo, pna , infected ulcers

## 2024-08-30 NOTE — PATIENT PROFILE ADULT - FALL HARM RISK - HARM RISK INTERVENTIONS

## 2024-08-30 NOTE — ED PROVIDER NOTE - OBJECTIVE STATEMENT
79 year old male , non known pmhx brought in by ems after fall. Pt fell off of recliner at 1010 am, landlord downstairs heard thump and went up and found him on the floor. pt remembers fall, denies hitting his head. no loc. as per patients daughter, pt has been having frequent falls and cannot live alone anymore.

## 2024-08-30 NOTE — PHYSICAL THERAPY INITIAL EVALUATION ADULT - ADDITIONAL COMMENTS
Pt ambulates with a straight cane at baseline. Pt ambulates with a straight cane at baseline; not on home O2.

## 2024-08-30 NOTE — ED ADULT NURSE REASSESSMENT NOTE - NS ED NURSE REASSESS COMMENT FT1
Patient presents to ED with un stageable pressure ulcer to sacrum. Area cleansed, barrier cream applied on buttock, Allevyn placed

## 2024-08-30 NOTE — H&P ADULT - HISTORY OF PRESENT ILLNESS
78yo M with no known pmhx brought in by EMS after fall 78yo M with no known pmhx brought in by EMS after fall. Pt states he fell out of his recliner but currently has no pain. He is AOx2 and limited historian. Reports he was in his usual state of health prior to his fall . He lives at home alone and states he takes no medications. Currently endorsing a productive cough of (Sputum). Patient denies any chest pain, fever, nausea, vomiting, night sweats. Former smoker of 2 years. (occupation). No recent travel or sick contact.      In the ED, patient was hypoxic noted to be 90% on RA, was placed on 3L nasal cannula, and his O2 improved to 95%. Labs notable for leukocytosis of 17 and lactate of 3.0. CT Head/Spine negative. CT Chest is significant for upper and lower lobe reticulonodular and consolidative opacities.  Masslike opacity within the right lower lobe measuring up to 4.7 cm.  Enlarged right hilar lymph nodes. Differential includes neoplastic versus infectious etiologies. Patient was given vancomycin and cefepime due to suspicion of PNA.

## 2024-08-30 NOTE — H&P ADULT - NSHPLABSRESULTS_GEN_ALL_CORE
12.6   17.25 )-----------( 333      ( 30 Aug 2024 11:40 )             37.4   08-30    135  |  99  |  36<H>  ----------------------------<  260<H>  4.0   |  24  |  0.8    Ca    9.3      30 Aug 2024 11:40    TPro  6.2  /  Alb  3.2<L>  /  TBili  0.8  /  DBili  x   /  AST  17  /  ALT  11  /  AlkPhos  80  08-30    ACC: [id number] EXAM:  CT ANGIO CHEST PULM ART WAWIC   ORDERED BY: LIZZETH KEMP     ACC: [id number] EXAM:  CT ABDOMEN AND PELVIS IC   ORDERED BY: LIZZETH KEMP     PROCEDURE DATE:  08/30/2024          INTERPRETATION:  CLINICAL INFORMATION: Trauma tochest, abdomen pelvis    COMPARISON: None.    CORRELATION:  XR CHEST 8/30/2024.    CONTRAST/COMPLICATIONS:  IV Contrast: IV contrast documented in unlinked concurrent exam   (accession [id number]), Omnipaque 350 (accession [id number])  90 cc   administered   10 cc discarded  Oral Contrast: NONE  Complications: None reported at time of study completion    PROCEDURE:  CT Angiography of the Chest was performed followed by portal venous phase   imaging of the Abdomen and Pelvis.  Sagittal and coronal reformats were performed as well as 3D (MIP)   reconstructions.    FINDINGS:  CHEST:  LUNGS AND LARGE AIRWAYS: Debris within the lower trachea and right main   bronchus, compatible with secretions. Upper lobe predominant,   centrilobular emphysematous changes. Extensive areas of reticulonodular   opacities within the bilateral upper and lower lobes. More confluent   solid appearing masslike opacities within the lower lobes. For example   right lower lobe masslike opacity measuring 4.7 x 2.3 cm with region of   central lucency (series 303, 153).  PLEURA: Bilateral pleural thickening and calcifications  VESSELS: Coronary artery calcifications.  HEART: Heart size is normal. Pericardial calcification.  MEDIASTINUM AND ARTURO: Multiple enlarged externaland perihilar lymph   nodes measuring up to 3.4 x 2.8 cm (series 303, image 113)  CHEST WALL AND LOWER NECK: Gynecomastia    ABDOMEN AND PELVIS:  LIVER: Within normal limits.  BILE DUCTS: Normal caliber.  GALLBLADDER: Distended gallbladder  SPLEEN: Within normal limits.  PANCREAS: Within normal limits.  ADRENALS: Within normal limits.  KIDNEYS/URETERS: No renal stones or hydronephrosis. Renal cysts and other   subcentimeter hypodensities, too small to further characterize    BLADDER: Distended urinary bladder  REPRODUCTIVE ORGANS: Obscured by streak artifact    BOWEL: No bowel obstruction. Colonic diverticulosis.  PERITONEUM/RETROPERITONEUM: Within normal limits.  VESSELS: Atherosclerotic changes.  LYMPH NODES: No lymphadenopathy.  ABDOMINAL WALL: Within normal limits.  BONES: Osteopenia. Status post right total hip arthroplasty and left   cephalomedullary nail. Severe left hip degenerative changes. Mild wedging   of the T12 vertebral body. Anasarca    IMPRESSION:    CHEST:    No CTA evidence of acute pulmonary embolus.    Upper and lower lobe reticulonodular and consolidative opacities.   Masslike opacity within the right lower lobe measuring up to 4.7 cm.   Enlarged right hilar lymph nodes. Differential includes neoplastic versus   infectious etiologies.    Age indeterminate however chronic appearing T12 mild compression deformity    ABDOMEN/PELVIS:    No CT evidence of acute traumatic injury within the abdomen or pelvis.    --- End of Report ---

## 2024-08-30 NOTE — ED PROVIDER NOTE - STUDIES
Brow Lift Text: A midfrontal incision was made medially to the defect to allow access to the tissues just superior to the left eyebrow. Following careful dissection inferiorly in a supraperiosteal plane to the level of the left eyebrow, several 3-0 monocryl sutures were used to resuspend the eyebrow orbicularis oculi muscular unit to the superior frontal bone periosteum. This resulted in an appropriate reapproximation of static eyebrow symmetry and correction of the left brow ptosis. EKG - see results section for interpretation EKG - see results section for interpretation/Xray Image(s) - see wet read section for interpretation/CT Scan images

## 2024-08-30 NOTE — H&P ADULT - NS ATTEND AMEND GEN_ALL_CORE FT
None
Agree with above, made edits as necessary     GENERAL: thin/frail; poor hygiene   HEAD:  Atraumatic, Normocephalic; temporal wasting  EYES: EOMI, PERRLA, conjunctiva and sclera clear  ENT: Moist mucous membranes  NECK: No JVD  CHEST/LUNG:  speaks in short sentences w/ deep breaths, shoddy breath sounds bl, no wheezes or crackles  HEART: Regular rate and rhythm; No murmurs, rubs, or gallops  ABDOMEN: Soft, nontender, nondistended, scaphoid  EXTREMITIES:  no pitting edema  NERVOUS SYSTEM:  A&Ox3, moves all extremities against resistance   SKIN: hyperpigmented BL LE    appreciate PAs orders and documentation;

## 2024-08-30 NOTE — PATIENT PROFILE ADULT - NSPROPOAPRESSUREINJURY_GEN_A_NUR
Had been well controlled on lifestyle changes, today slightly above goal, reviewed goals of Self management.  If possible check BP out of office and bring in Blood pressure readings for review yes

## 2024-08-30 NOTE — H&P ADULT - CONVERSATION DETAILS
Patient wishes to be FULL CODE - Had open discussion with pt about code status, and potential consequences to QOL w/ ROSC;  - Discussed concerning findings on CT chest w/ pt;  - pt endorses that his daughter should make decisions on his behalf if he is unable to do so    Patient wishes to be FULL CODE

## 2024-08-30 NOTE — ED PROVIDER NOTE - PROGRESS NOTE DETAILS
ED Attending JOSSIE Ecketr  Extensive conversation had with daughter, states pt cannot live alone and needs placement, reqeusting social work on admission.  I have fully discussed the medical management and delivery of care with the patient and family. I have discussed any available labs, imaging and treatment options .  Pt admitted for further care & management.

## 2024-08-31 LAB
A1C WITH ESTIMATED AVERAGE GLUCOSE RESULT: 9.3 % — HIGH (ref 4–5.6)
ALBUMIN SERPL ELPH-MCNC: 3.2 G/DL — LOW (ref 3.5–5.2)
ALP SERPL-CCNC: 77 U/L — SIGNIFICANT CHANGE UP (ref 30–115)
ALT FLD-CCNC: 11 U/L — SIGNIFICANT CHANGE UP (ref 0–41)
ANION GAP SERPL CALC-SCNC: 14 MMOL/L — SIGNIFICANT CHANGE UP (ref 7–14)
AST SERPL-CCNC: 19 U/L — SIGNIFICANT CHANGE UP (ref 0–41)
BASOPHILS # BLD AUTO: 0.02 K/UL — SIGNIFICANT CHANGE UP (ref 0–0.2)
BASOPHILS NFR BLD AUTO: 0.2 % — SIGNIFICANT CHANGE UP (ref 0–1)
BILIRUB SERPL-MCNC: 0.8 MG/DL — SIGNIFICANT CHANGE UP (ref 0.2–1.2)
BUN SERPL-MCNC: 21 MG/DL — HIGH (ref 10–20)
CALCIUM SERPL-MCNC: 9.2 MG/DL — SIGNIFICANT CHANGE UP (ref 8.4–10.5)
CHLORIDE SERPL-SCNC: 97 MMOL/L — LOW (ref 98–110)
CHOLEST SERPL-MCNC: 165 MG/DL — SIGNIFICANT CHANGE UP
CO2 SERPL-SCNC: 27 MMOL/L — SIGNIFICANT CHANGE UP (ref 17–32)
CREAT SERPL-MCNC: 0.6 MG/DL — LOW (ref 0.7–1.5)
EGFR: 99 ML/MIN/1.73M2 — SIGNIFICANT CHANGE UP
EOSINOPHIL # BLD AUTO: 0.06 K/UL — SIGNIFICANT CHANGE UP (ref 0–0.7)
EOSINOPHIL NFR BLD AUTO: 0.5 % — SIGNIFICANT CHANGE UP (ref 0–8)
ESTIMATED AVERAGE GLUCOSE: 220 MG/DL — HIGH (ref 68–114)
FERRITIN SERPL-MCNC: 696 NG/ML — HIGH (ref 30–400)
FLUAV AG NPH QL: SIGNIFICANT CHANGE UP
FLUBV AG NPH QL: SIGNIFICANT CHANGE UP
FOLATE SERPL-MCNC: <2 NG/ML — LOW
GLUCOSE BLDC GLUCOMTR-MCNC: 145 MG/DL — HIGH (ref 70–99)
GLUCOSE BLDC GLUCOMTR-MCNC: 146 MG/DL — HIGH (ref 70–99)
GLUCOSE BLDC GLUCOMTR-MCNC: 167 MG/DL — HIGH (ref 70–99)
GLUCOSE SERPL-MCNC: 155 MG/DL — HIGH (ref 70–99)
HCT VFR BLD CALC: 35.4 % — LOW (ref 42–52)
HDLC SERPL-MCNC: 32 MG/DL — LOW
HGB BLD-MCNC: 12.2 G/DL — LOW (ref 14–18)
IMM GRANULOCYTES NFR BLD AUTO: 1.5 % — HIGH (ref 0.1–0.3)
IRON SATN MFR SERPL: 44 % — SIGNIFICANT CHANGE UP (ref 15–50)
IRON SATN MFR SERPL: 85 UG/DL — SIGNIFICANT CHANGE UP (ref 35–150)
LIPID PNL WITH DIRECT LDL SERPL: 100 MG/DL — HIGH
LYMPHOCYTES # BLD AUTO: 0.91 K/UL — LOW (ref 1.2–3.4)
LYMPHOCYTES # BLD AUTO: 7 % — LOW (ref 20.5–51.1)
MAGNESIUM SERPL-MCNC: 1.5 MG/DL — LOW (ref 1.8–2.4)
MCHC RBC-ENTMCNC: 33.1 PG — HIGH (ref 27–31)
MCHC RBC-ENTMCNC: 34.5 G/DL — SIGNIFICANT CHANGE UP (ref 32–37)
MCV RBC AUTO: 95.9 FL — HIGH (ref 80–94)
MONOCYTES # BLD AUTO: 0.59 K/UL — SIGNIFICANT CHANGE UP (ref 0.1–0.6)
MONOCYTES NFR BLD AUTO: 4.5 % — SIGNIFICANT CHANGE UP (ref 1.7–9.3)
NEUTROPHILS # BLD AUTO: 11.23 K/UL — HIGH (ref 1.4–6.5)
NEUTROPHILS NFR BLD AUTO: 86.3 % — HIGH (ref 42.2–75.2)
NON HDL CHOLESTEROL: 133 MG/DL — HIGH
NRBC # BLD: 0 /100 WBCS — SIGNIFICANT CHANGE UP (ref 0–0)
PHOSPHATE SERPL-MCNC: 1.7 MG/DL — LOW (ref 2.1–4.9)
PLATELET # BLD AUTO: 280 K/UL — SIGNIFICANT CHANGE UP (ref 130–400)
PMV BLD: 9.4 FL — SIGNIFICANT CHANGE UP (ref 7.4–10.4)
POTASSIUM SERPL-MCNC: 3 MMOL/L — LOW (ref 3.5–5)
POTASSIUM SERPL-SCNC: 3 MMOL/L — LOW (ref 3.5–5)
PROCALCITONIN SERPL-MCNC: 32.9 NG/ML — HIGH (ref 0.02–0.1)
PROT SERPL-MCNC: 6.2 G/DL — SIGNIFICANT CHANGE UP (ref 6–8)
RBC # BLD: 3.69 M/UL — LOW (ref 4.7–6.1)
RBC # FLD: 13.1 % — SIGNIFICANT CHANGE UP (ref 11.5–14.5)
RSV RNA NPH QL NAA+NON-PROBE: SIGNIFICANT CHANGE UP
SARS-COV-2 RNA SPEC QL NAA+PROBE: SIGNIFICANT CHANGE UP
SODIUM SERPL-SCNC: 138 MMOL/L — SIGNIFICANT CHANGE UP (ref 135–146)
T4 FREE+ TSH PNL SERPL: 2.86 UIU/ML — SIGNIFICANT CHANGE UP (ref 0.27–4.2)
TIBC SERPL-MCNC: 195 UG/DL — LOW (ref 220–430)
TRIGL SERPL-MCNC: 167 MG/DL — HIGH
UIBC SERPL-MCNC: 110 UG/DL — SIGNIFICANT CHANGE UP (ref 110–370)
VIT B12 SERPL-MCNC: 788 PG/ML — SIGNIFICANT CHANGE UP (ref 232–1245)
WBC # BLD: 13.01 K/UL — HIGH (ref 4.8–10.8)
WBC # FLD AUTO: 13.01 K/UL — HIGH (ref 4.8–10.8)

## 2024-08-31 PROCEDURE — 99232 SBSQ HOSP IP/OBS MODERATE 35: CPT

## 2024-08-31 PROCEDURE — 99222 1ST HOSP IP/OBS MODERATE 55: CPT

## 2024-08-31 RX ORDER — SODIUM PHOSPHATE, DIBASIC, ANHYDROUS, POTASSIUM PHOSPHATE, MONOBASIC, AND SODIUM PHOSPHATE, MONOBASIC, MONOHYDRATE 852; 155; 130 MG/1; MG/1; MG/1
1 TABLET, COATED ORAL
Refills: 0 | Status: DISCONTINUED | OUTPATIENT
Start: 2024-08-31 | End: 2024-09-02

## 2024-08-31 RX ADMIN — AZITHROMYCIN 255 MILLIGRAM(S): 500 TABLET, FILM COATED ORAL at 12:17

## 2024-08-31 RX ADMIN — Medication 100 MILLIGRAM(S): at 16:35

## 2024-08-31 RX ADMIN — Medication 25 GRAM(S): at 18:22

## 2024-08-31 RX ADMIN — Medication 25 GRAM(S): at 20:50

## 2024-08-31 RX ADMIN — ENOXAPARIN SODIUM 40 MILLIGRAM(S): 100 INJECTION SUBCUTANEOUS at 22:16

## 2024-08-31 RX ADMIN — SODIUM PHOSPHATE, DIBASIC, ANHYDROUS, POTASSIUM PHOSPHATE, MONOBASIC, AND SODIUM PHOSPHATE, MONOBASIC, MONOHYDRATE 1 PACKET(S): 852; 155; 130 TABLET, COATED ORAL at 22:16

## 2024-08-31 RX ADMIN — SODIUM PHOSPHATE, DIBASIC, ANHYDROUS, POTASSIUM PHOSPHATE, MONOBASIC, AND SODIUM PHOSPHATE, MONOBASIC, MONOHYDRATE 1 PACKET(S): 852; 155; 130 TABLET, COATED ORAL at 18:28

## 2024-08-31 RX ADMIN — Medication 1: at 12:18

## 2024-08-31 NOTE — SWALLOW BEDSIDE ASSESSMENT ADULT - SLP PERTINENT HISTORY OF CURRENT PROBLEM
78yo M with no known pmhx brought in by EMS after fall. Workup revealed mass like consolidation in the right lung concerning for neoplasm versus pneumonia. Given his productive cough and WBC, reasonable to continue treatment for pneumonia. Will switch to Ceftriaxone/Azithro as patient presented from home and not frequently in healthcare setting. Plan to consult Pulmonology to weigh in as opacity is also concerning for potential neoplasm.

## 2024-08-31 NOTE — SWALLOW BEDSIDE ASSESSMENT ADULT - COMMENTS
CT Chest: CHEST: No CTA evidence of acute pulmonary embolus. Upper and lower lobe reticulonodular and consolidative opacities. Masslike opacity within the right lower lobe measuring up to 4.7 cm. Enlarged right hilar lymph nodes. Differential includes neoplastic versus infectious etiologies. Will monitor

## 2024-08-31 NOTE — PROGRESS NOTE ADULT - ASSESSMENT
80yo M with no known pmhx brought in by EMS after fall, fth pna vs mass    #Fall  - likely mechanical in nature. low concern for syncope.  - PT eval    #CAP vs mass vs mass w/ post obstructive pna  #Acute Hypoxemic respiratory failure  - Continue supplemental O2, wean as able.  - Albuterol prn   - Will switch to Ceftriaxone/Azithromycin  - Check sputum culture, blood culture, COVID/Flu Swab, mrsa  - Send Legionella culture, strep pneumo   - Add on Procal   - Supportive care with Tylenol  - SLP eval  - pulm recs appreciated, treat pna, f/u OP for ?bx    #Hyperglycemia  - A1C 9.3  - Place on insulin sliding scale for now   - metformin on d/c w/ f/u PCP    # HCM  - pt w/ limited medical contact, age appropriate screening 78yo M with no known pmhx brought in by EMS after fall, fth pna vs mass    #Fall  - likely mechanical in nature. low concern for syncope.  - PT eval    #CAP vs mass vs mass w/ post obstructive pna  #Acute Hypoxemic respiratory failure  - Continue supplemental O2, wean as able.  - Albuterol prn   - Will switch to Ceftriaxone/Azithromycin  - Check sputum culture, blood culture, COVID/Flu Swab, mrsa  - Send Legionella culture, strep pneumo   - Add on Procal   - Supportive care with Tylenol  - SLP eval  - pulm recs appreciated, treat pna, f/u OP for ?bx    #Hyperglycemia  - A1C 9.3  - Place on insulin sliding scale for now   - metformin on d/c w/ f/u PCP    # electrolyte derangement  # hypo mag hypo K hypo Phos  - phos NaK  - Mg IV  - trend    # HCM  - pt w/ limited medical contact, age appropriate screening

## 2024-09-01 LAB
ANION GAP SERPL CALC-SCNC: 12 MMOL/L — SIGNIFICANT CHANGE UP (ref 7–14)
BASOPHILS # BLD AUTO: 0.02 K/UL — SIGNIFICANT CHANGE UP (ref 0–0.2)
BASOPHILS NFR BLD AUTO: 0.2 % — SIGNIFICANT CHANGE UP (ref 0–1)
BUN SERPL-MCNC: 11 MG/DL — SIGNIFICANT CHANGE UP (ref 10–20)
CALCIUM SERPL-MCNC: 8.4 MG/DL — SIGNIFICANT CHANGE UP (ref 8.4–10.5)
CHLORIDE SERPL-SCNC: 97 MMOL/L — LOW (ref 98–110)
CO2 SERPL-SCNC: 28 MMOL/L — SIGNIFICANT CHANGE UP (ref 17–32)
CREAT SERPL-MCNC: 0.6 MG/DL — LOW (ref 0.7–1.5)
EGFR: 99 ML/MIN/1.73M2 — SIGNIFICANT CHANGE UP
EOSINOPHIL # BLD AUTO: 0.07 K/UL — SIGNIFICANT CHANGE UP (ref 0–0.7)
EOSINOPHIL NFR BLD AUTO: 0.7 % — SIGNIFICANT CHANGE UP (ref 0–8)
GLUCOSE BLDC GLUCOMTR-MCNC: 120 MG/DL — HIGH (ref 70–99)
GLUCOSE BLDC GLUCOMTR-MCNC: 140 MG/DL — HIGH (ref 70–99)
GLUCOSE BLDC GLUCOMTR-MCNC: 164 MG/DL — HIGH (ref 70–99)
GLUCOSE BLDC GLUCOMTR-MCNC: 236 MG/DL — HIGH (ref 70–99)
GLUCOSE SERPL-MCNC: 143 MG/DL — HIGH (ref 70–99)
HCT VFR BLD CALC: 34.1 % — LOW (ref 42–52)
HGB BLD-MCNC: 11.6 G/DL — LOW (ref 14–18)
IMM GRANULOCYTES NFR BLD AUTO: 1.2 % — HIGH (ref 0.1–0.3)
LYMPHOCYTES # BLD AUTO: 1.2 K/UL — SIGNIFICANT CHANGE UP (ref 1.2–3.4)
LYMPHOCYTES # BLD AUTO: 12 % — LOW (ref 20.5–51.1)
MAGNESIUM SERPL-MCNC: 2.1 MG/DL — SIGNIFICANT CHANGE UP (ref 1.8–2.4)
MCHC RBC-ENTMCNC: 32.6 PG — HIGH (ref 27–31)
MCHC RBC-ENTMCNC: 34 G/DL — SIGNIFICANT CHANGE UP (ref 32–37)
MCV RBC AUTO: 95.8 FL — HIGH (ref 80–94)
MONOCYTES # BLD AUTO: 0.61 K/UL — HIGH (ref 0.1–0.6)
MONOCYTES NFR BLD AUTO: 6.1 % — SIGNIFICANT CHANGE UP (ref 1.7–9.3)
MRSA PCR RESULT.: NEGATIVE — SIGNIFICANT CHANGE UP
NEUTROPHILS # BLD AUTO: 7.95 K/UL — HIGH (ref 1.4–6.5)
NEUTROPHILS NFR BLD AUTO: 79.8 % — HIGH (ref 42.2–75.2)
NRBC # BLD: 0 /100 WBCS — SIGNIFICANT CHANGE UP (ref 0–0)
PHOSPHATE SERPL-MCNC: 2.1 MG/DL — SIGNIFICANT CHANGE UP (ref 2.1–4.9)
PLATELET # BLD AUTO: 285 K/UL — SIGNIFICANT CHANGE UP (ref 130–400)
PMV BLD: 9.7 FL — SIGNIFICANT CHANGE UP (ref 7.4–10.4)
POTASSIUM SERPL-MCNC: 3.5 MMOL/L — SIGNIFICANT CHANGE UP (ref 3.5–5)
POTASSIUM SERPL-SCNC: 3.5 MMOL/L — SIGNIFICANT CHANGE UP (ref 3.5–5)
RBC # BLD: 3.56 M/UL — LOW (ref 4.7–6.1)
RBC # FLD: 13 % — SIGNIFICANT CHANGE UP (ref 11.5–14.5)
SODIUM SERPL-SCNC: 137 MMOL/L — SIGNIFICANT CHANGE UP (ref 135–146)
WBC # BLD: 9.97 K/UL — SIGNIFICANT CHANGE UP (ref 4.8–10.8)
WBC # FLD AUTO: 9.97 K/UL — SIGNIFICANT CHANGE UP (ref 4.8–10.8)

## 2024-09-01 PROCEDURE — 99233 SBSQ HOSP IP/OBS HIGH 50: CPT

## 2024-09-01 RX ADMIN — Medication 100 MILLIGRAM(S): at 15:57

## 2024-09-01 RX ADMIN — SODIUM PHOSPHATE, DIBASIC, ANHYDROUS, POTASSIUM PHOSPHATE, MONOBASIC, AND SODIUM PHOSPHATE, MONOBASIC, MONOHYDRATE 1 PACKET(S): 852; 155; 130 TABLET, COATED ORAL at 21:31

## 2024-09-01 RX ADMIN — AZITHROMYCIN 255 MILLIGRAM(S): 500 TABLET, FILM COATED ORAL at 12:23

## 2024-09-01 RX ADMIN — Medication 1: at 08:12

## 2024-09-01 RX ADMIN — SODIUM PHOSPHATE, DIBASIC, ANHYDROUS, POTASSIUM PHOSPHATE, MONOBASIC, AND SODIUM PHOSPHATE, MONOBASIC, MONOHYDRATE 1 PACKET(S): 852; 155; 130 TABLET, COATED ORAL at 12:08

## 2024-09-01 RX ADMIN — Medication 2: at 16:54

## 2024-09-01 RX ADMIN — ENOXAPARIN SODIUM 40 MILLIGRAM(S): 100 INJECTION SUBCUTANEOUS at 21:31

## 2024-09-01 RX ADMIN — SODIUM PHOSPHATE, DIBASIC, ANHYDROUS, POTASSIUM PHOSPHATE, MONOBASIC, AND SODIUM PHOSPHATE, MONOBASIC, MONOHYDRATE 1 PACKET(S): 852; 155; 130 TABLET, COATED ORAL at 15:57

## 2024-09-01 RX ADMIN — SODIUM PHOSPHATE, DIBASIC, ANHYDROUS, POTASSIUM PHOSPHATE, MONOBASIC, AND SODIUM PHOSPHATE, MONOBASIC, MONOHYDRATE 1 PACKET(S): 852; 155; 130 TABLET, COATED ORAL at 08:57

## 2024-09-01 NOTE — DIETITIAN INITIAL EVALUATION ADULT - PERTINENT MEDS FT
MEDICATIONS  (STANDING):  azithromycin  IVPB 500 milliGRAM(s) IV Intermittent daily  cefTRIAXone   IVPB 1000 milliGRAM(s) IV Intermittent every 24 hours  dextrose 5%. 1000 milliLiter(s) (50 mL/Hr) IV Continuous <Continuous>  dextrose 5%. 1000 milliLiter(s) (100 mL/Hr) IV Continuous <Continuous>    insulin lispro (ADMELOG) corrective regimen sliding scale   SubCutaneous three times a day before meals  insulin lispro (ADMELOG) corrective regimen sliding scale   SubCutaneous at bedtime  potassium phosphate / sodium phosphate Powder (PHOS-NaK) 1 Packet(s) Oral four times a day before meals    MEDICATIONS  (PRN):  acetaminophen     Tablet .. 650 milliGRAM(s) Oral every 6 hours PRN Temp greater or equal to 38C (100.4F), Mild Pain (1 - 3)  albuterol    0.083% 2.5 milliGRAM(s) Nebulizer every 4 hours PRN Bronchospasm  aluminum hydroxide/magnesium hydroxide/simethicone Suspension 30 milliLiter(s) Oral every 4 hours PRN Dyspepsia  dextrose Oral Gel 15 Gram(s) Oral once PRN Blood Glucose LESS THAN 70 milliGRAM(s)/deciliter  melatonin 3 milliGRAM(s) Oral at bedtime PRN Insomnia  ondansetron Injectable 4 milliGRAM(s) IV Push every 8 hours PRN Nausea and/or Vomiting

## 2024-09-01 NOTE — CONSULT NOTE ADULT - ASSESSMENT
Impression:     Pneumonia Vs Lung mass   Reticular opacities - ILD ?   Enlarged mediastinal adenopathy     Plan:     CT reviewed   Malignancy vs pneumonia is the differential   On Abx with no fevers  Procal markedly elevated   Enlarged right hilar node noted   Recommend to finish abx course - 7d  Sputum culture; RVP negative   Repeat CT chest in 4-6 weeks   If persistent findings then will need PET and bx   Outpatient follow up once stable 
ASSESSMENT  This is a 79 year old M with no known pmhx brought in by EMS after fall.    IMPRESSION  #Concern for CAP  #Emphysema, reticulonodular and consolidative opacities.   #Mass like opacity with in right lower lobe measuring upto 4.7 cm. Lymph node enlargement-Possible neoplastic process.   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  MRSA negative  RECOMMENDATIONS  -Collect sputum cultures if possible.   -Reasonable to keep on IV ceftriaxone 1 gram daily+Azithromycin 500mg q 24 hours for 5 days.   -Outpatient follow up with pulmonary for repeat CT chest and lung mass workup.     If any questions, please send a message or call on Transfer To Teams  Please continue to update ID with any pertinent new laboratory or radiographic findings.    Abran Velázquez M.D  Infectious Diseases Attending/   [employer] at [facility]

## 2024-09-01 NOTE — DIETITIAN INITIAL EVALUATION ADULT - OTHER INFO
pt is 79 year old man no pmhx, BIBA s/p fall. admitted with CAP mass like RLL consolidation concerning for malignancy. pt also noted to have new onset DM. pt to be discharged to SNF

## 2024-09-01 NOTE — DIETITIAN INITIAL EVALUATION ADULT - PERTINENT LABORATORY DATA
09-01    137  |  97<L>  |  11  ----------------------------<  143<H>  3.5   |  28  |  0.6<L>    Ca    8.4      01 Sep 2024 09:12  Phos  2.1     09-01  Mg     2.1     09-01    TPro  6.2  /  Alb  3.2<L>  /  TBili  0.8  /  DBili  x   /  AST  19  /  ALT  11  /  AlkPhos  77  08-31  POCT Blood Glucose.: 236 mg/dL (09-01-24 @ 16:45)  A1C with Estimated Average Glucose Result: 9.3 % (08-31-24 @ 08:52)

## 2024-09-01 NOTE — DIETITIAN INITIAL EVALUATION ADULT - ORAL INTAKE PTA/DIET HISTORY
pt resided alone and would mostly consume take out and foods brought by daughter. NKFA or intolerances. UBW is 170# vs 145# today indicating an unintentional weight loss of 25# in 6 months.     presently on a soft and bite sized diet <50% of meals consumed. pt receptive to glucerna shakes

## 2024-09-01 NOTE — PROGRESS NOTE ADULT - ASSESSMENT
Orders for transfer to [address] [place] [address].  Report given to Dr Michael Christian by [facility] per request 78yo M with no known pmhx brought in by EMS after fall, fth pna vs mass    #Fall  - likely mechanical in nature. low concern for syncope.  - PT eval    #CAP vs mass vs mass w/ post obstructive pna  #Acute Hypoxemic respiratory failure  - Continue supplemental O2, wean as able.  - Albuterol prn   - Will switch to Ceftriaxone/Azithromycin  - Check sputum culture, blood culture, COVID/Flu Swab, mrsa  - Send Legionella culture, strep pneumo   - Add on Procal   - Supportive care with Tylenol  - SLP eval  - pulm recs appreciated, treat pna, f/u OP for ?bx    #Hyperglycemia  - A1C 9.3  - Place on insulin sliding scale for now   - metformin on d/c w/ f/u PCP    # electrolyte derangement  # hypo mag hypo K hypo Phos  - phos NaK  - Mg IV  - trend    # HCM  - pt w/ limited medical contact, age appropriate screening    dispo - snf

## 2024-09-02 LAB
ANION GAP SERPL CALC-SCNC: 13 MMOL/L — SIGNIFICANT CHANGE UP (ref 7–14)
BASOPHILS # BLD AUTO: 0.02 K/UL — SIGNIFICANT CHANGE UP (ref 0–0.2)
BASOPHILS NFR BLD AUTO: 0.2 % — SIGNIFICANT CHANGE UP (ref 0–1)
BUN SERPL-MCNC: 8 MG/DL — LOW (ref 10–20)
CALCIUM SERPL-MCNC: 7.9 MG/DL — LOW (ref 8.4–10.5)
CHLORIDE SERPL-SCNC: 96 MMOL/L — LOW (ref 98–110)
CO2 SERPL-SCNC: 26 MMOL/L — SIGNIFICANT CHANGE UP (ref 17–32)
CREAT SERPL-MCNC: 0.6 MG/DL — LOW (ref 0.7–1.5)
EGFR: 99 ML/MIN/1.73M2 — SIGNIFICANT CHANGE UP
EOSINOPHIL # BLD AUTO: 0.07 K/UL — SIGNIFICANT CHANGE UP (ref 0–0.7)
EOSINOPHIL NFR BLD AUTO: 0.8 % — SIGNIFICANT CHANGE UP (ref 0–8)
GLUCOSE BLDC GLUCOMTR-MCNC: 113 MG/DL — HIGH (ref 70–99)
GLUCOSE BLDC GLUCOMTR-MCNC: 135 MG/DL — HIGH (ref 70–99)
GLUCOSE BLDC GLUCOMTR-MCNC: 164 MG/DL — HIGH (ref 70–99)
GLUCOSE BLDC GLUCOMTR-MCNC: 180 MG/DL — HIGH (ref 70–99)
GLUCOSE SERPL-MCNC: 159 MG/DL — HIGH (ref 70–99)
HCT VFR BLD CALC: 32.9 % — LOW (ref 42–52)
HGB BLD-MCNC: 11.4 G/DL — LOW (ref 14–18)
IMM GRANULOCYTES NFR BLD AUTO: 0.6 % — HIGH (ref 0.1–0.3)
LYMPHOCYTES # BLD AUTO: 1.59 K/UL — SIGNIFICANT CHANGE UP (ref 1.2–3.4)
LYMPHOCYTES # BLD AUTO: 18 % — LOW (ref 20.5–51.1)
MAGNESIUM SERPL-MCNC: 1.8 MG/DL — SIGNIFICANT CHANGE UP (ref 1.8–2.4)
MCHC RBC-ENTMCNC: 32.5 PG — HIGH (ref 27–31)
MCHC RBC-ENTMCNC: 34.7 G/DL — SIGNIFICANT CHANGE UP (ref 32–37)
MCV RBC AUTO: 93.7 FL — SIGNIFICANT CHANGE UP (ref 80–94)
MONOCYTES # BLD AUTO: 0.72 K/UL — HIGH (ref 0.1–0.6)
MONOCYTES NFR BLD AUTO: 8.2 % — SIGNIFICANT CHANGE UP (ref 1.7–9.3)
NEUTROPHILS # BLD AUTO: 6.37 K/UL — SIGNIFICANT CHANGE UP (ref 1.4–6.5)
NEUTROPHILS NFR BLD AUTO: 72.2 % — SIGNIFICANT CHANGE UP (ref 42.2–75.2)
NRBC # BLD: 0 /100 WBCS — SIGNIFICANT CHANGE UP (ref 0–0)
PLATELET # BLD AUTO: 323 K/UL — SIGNIFICANT CHANGE UP (ref 130–400)
PMV BLD: 9.3 FL — SIGNIFICANT CHANGE UP (ref 7.4–10.4)
POTASSIUM SERPL-MCNC: 3.5 MMOL/L — SIGNIFICANT CHANGE UP (ref 3.5–5)
POTASSIUM SERPL-SCNC: 3.5 MMOL/L — SIGNIFICANT CHANGE UP (ref 3.5–5)
RBC # BLD: 3.51 M/UL — LOW (ref 4.7–6.1)
RBC # FLD: 12.9 % — SIGNIFICANT CHANGE UP (ref 11.5–14.5)
SODIUM SERPL-SCNC: 135 MMOL/L — SIGNIFICANT CHANGE UP (ref 135–146)
WBC # BLD: 8.82 K/UL — SIGNIFICANT CHANGE UP (ref 4.8–10.8)
WBC # FLD AUTO: 8.82 K/UL — SIGNIFICANT CHANGE UP (ref 4.8–10.8)

## 2024-09-02 PROCEDURE — 99232 SBSQ HOSP IP/OBS MODERATE 35: CPT

## 2024-09-02 PROCEDURE — 99221 1ST HOSP IP/OBS SF/LOW 40: CPT | Mod: GC

## 2024-09-02 RX ADMIN — Medication 1: at 16:45

## 2024-09-02 RX ADMIN — AZITHROMYCIN 255 MILLIGRAM(S): 500 TABLET, FILM COATED ORAL at 11:47

## 2024-09-02 RX ADMIN — Medication 25 GRAM(S): at 15:47

## 2024-09-02 RX ADMIN — ENOXAPARIN SODIUM 40 MILLIGRAM(S): 100 INJECTION SUBCUTANEOUS at 20:56

## 2024-09-02 RX ADMIN — Medication 3 MILLIGRAM(S): at 20:56

## 2024-09-02 RX ADMIN — Medication 100 MILLIGRAM(S): at 17:40

## 2024-09-02 RX ADMIN — Medication 25 GRAM(S): at 17:40

## 2024-09-02 RX ADMIN — Medication 1: at 07:56

## 2024-09-02 NOTE — PROGRESS NOTE ADULT - NUTRITIONAL ASSESSMENT
This patient has been assessed with a concern for Malnutrition and has been determined to have a diagnosis/diagnoses of Severe protein-calorie malnutrition.    This patient is being managed with:   Diet Soft and Bite Sized-  Entered: Sep  1 2024 12:05PM    The following pending diet order is being considered for treatment of Severe protein-calorie malnutrition:  Diet Easy to Chew-  Consistent Carbohydrate {Evening Snack} (CSTCHOSN)  Supplement Feeding Modality:  Oral  Glucerna Shake Cans or Servings Per Day:  1       Frequency:  Two Times a day  Entered: Sep  1 2024  7:31PM

## 2024-09-02 NOTE — PROGRESS NOTE ADULT - ASSESSMENT
80yo M with no known pmhx brought in by EMS after fall, fth pna vs mass    #Fall  - likely mechanical in nature. low concern for syncope.  - PT eval    #CAP vs mass vs mass w/ post obstructive pna  #Acute Hypoxemic respiratory failure  - Continue supplemental O2, wean as able.  - Albuterol prn   - Will switch to Ceftriaxone/Azithromycin  - Check sputum culture, blood culture, COVID/Flu Swab, mrsa  - Send Legionella culture, strep pneumo   - Add on Procal   - Supportive care with Tylenol  - SLP eval  - pulm recs appreciated, treat pna, f/u OP for ?bx    #Hyperglycemia  - A1C 9.3  - Place on insulin sliding scale for now   - metformin on d/c w/ f/u PCP    # electrolyte derangement  # hypo mag hypo K hypo Phos  - phos NaK  - Mg IV  - trend    # HCM  - pt w/ limited medical contact, age appropriate screening    dispo - snf

## 2024-09-02 NOTE — CONSULT NOTE ADULT - SUBJECTIVE AND OBJECTIVE BOX
Patient is a 79y old  Male who presents with a chief complaint of Fall; Pneumonia; Safe dispo (30 Aug 2024 15:42)      HPI:  78yo M with no known pmhx brought in by EMS after fall. Pt states he fell out of his recliner but currently has no pain. He is AOx2 and limited historian. Reports he was in his usual state of health prior to his fall . He lives at home alone and states he takes no medications. Currently endorsing a productive cough of (Sputum). Patient denies any chest pain, fever, nausea, vomiting, night sweats. Former smoker of 2 years. (occupation). No recent travel or sick contact.      In the ED, patient was hypoxic noted to be 90% on RA, was placed on 3L nasal cannula, and his O2 improved to 95%. Labs notable for leukocytosis of 17 and lactate of 3.0. CT Head/Spine negative. CT Chest is significant for upper and lower lobe reticulonodular and consolidative opacities.  Masslike opacity within the right lower lobe measuring up to 4.7 cm.  Enlarged right hilar lymph nodes. Differential includes neoplastic versus infectious etiologies. Patient was given vancomycin and cefepime due to suspicion of PNA.   (30 Aug 2024 15:42)      PAST MEDICAL & SURGICAL HISTORY:  No pertinent past medical history      No significant past surgical history          FAMILY HISTORY:  :  No known cardiovacular family hisotry     ROS:  See HPI     Allergies    No Known Allergies    Intolerances          PHYSICAL EXAM    ICU Vital Signs Last 24 Hrs  T(C): 35.6 (31 Aug 2024 14:33), Max: 36.5 (30 Aug 2024 20:59)  T(F): 96.1 (31 Aug 2024 14:33), Max: 97.7 (30 Aug 2024 20:59)  HR: 91 (31 Aug 2024 14:33) (84 - 95)  BP: 107/68 (31 Aug 2024 14:33) (104/64 - 120/67)  BP(mean): --  ABP: --  ABP(mean): --  RR: 18 (31 Aug 2024 14:33) (18 - 19)  SpO2: 95% (31 Aug 2024 12:25) (95% - 100%)    O2 Parameters below as of 31 Aug 2024 12:25  Patient On (Oxygen Delivery Method): room air            General: In NAD   HEENT:  LARRY              Lymphatic system: No cervical LN   Lungs: Bilateral BS, crackles   Cardiovascular: Regular  Gastrointestinal: Soft, Positive BS  Musculoskeletal: No clubbing.  Moves all extremities.  Full range of motion   Skin: Warm.  Intact  Neurological: No motor or sensory deficit         LABS:                          12.2   13.01 )-----------( 280      ( 31 Aug 2024 08:52 )             35.4                                               08-31    138  |  97<L>  |  21<H>  ----------------------------<  155<H>  3.0<L>   |  27  |  0.6<L>    Ca    9.2      31 Aug 2024 08:52  Phos  1.7     08-31  Mg     1.5     08-31    TPro  6.2  /  Alb  3.2<L>  /  TBili  0.8  /  DBili  x   /  AST  19  /  ALT  11  /  AlkPhos  77  08-31      PT/INR - ( 30 Aug 2024 11:40 )   PT: 12.50 sec;   INR: 1.10 ratio         PTT - ( 30 Aug 2024 11:40 )  PTT:35.5 sec                                       Urinalysis Basic - ( 31 Aug 2024 08:52 )    Color: x / Appearance: x / SG: x / pH: x  Gluc: 155 mg/dL / Ketone: x  / Bili: x / Urobili: x   Blood: x / Protein: x / Nitrite: x   Leuk Esterase: x / RBC: x / WBC x   Sq Epi: x / Non Sq Epi: x / Bacteria: x                                                  LIVER FUNCTIONS - ( 31 Aug 2024 08:52 )  Alb: 3.2 g/dL / Pro: 6.2 g/dL / ALK PHOS: 77 U/L / ALT: 11 U/L / AST: 19 U/L / GGT: x                                                                                                                                           MEDICATIONS  (STANDING):  azithromycin  IVPB 500 milliGRAM(s) IV Intermittent daily  cefTRIAXone   IVPB 1000 milliGRAM(s) IV Intermittent every 24 hours  dextrose 5%. 1000 milliLiter(s) (50 mL/Hr) IV Continuous <Continuous>  dextrose 5%. 1000 milliLiter(s) (100 mL/Hr) IV Continuous <Continuous>  dextrose 50% Injectable 12.5 Gram(s) IV Push once  dextrose 50% Injectable 25 Gram(s) IV Push once  dextrose 50% Injectable 25 Gram(s) IV Push once  enoxaparin Injectable 40 milliGRAM(s) SubCutaneous every 24 hours  glucagon  Injectable 1 milliGRAM(s) IntraMuscular once  insulin lispro (ADMELOG) corrective regimen sliding scale   SubCutaneous at bedtime  insulin lispro (ADMELOG) corrective regimen sliding scale   SubCutaneous three times a day before meals    MEDICATIONS  (PRN):  acetaminophen     Tablet .. 650 milliGRAM(s) Oral every 6 hours PRN Temp greater or equal to 38C (100.4F), Mild Pain (1 - 3)  albuterol    0.083% 2.5 milliGRAM(s) Nebulizer every 4 hours PRN Bronchospasm  aluminum hydroxide/magnesium hydroxide/simethicone Suspension 30 milliLiter(s) Oral every 4 hours PRN Dyspepsia  dextrose Oral Gel 15 Gram(s) Oral once PRN Blood Glucose LESS THAN 70 milliGRAM(s)/deciliter  melatonin 3 milliGRAM(s) Oral at bedtime PRN Insomnia  ondansetron Injectable 4 milliGRAM(s) IV Push every 8 hours PRN Nausea and/or Vomiting        
Podiatry Consult Note    Subjective:    PHILIPPE BALL is a 79y year old Male seen at bedside with a chief complaint of  painful thickened, dystrophic, ingrowing and long toenails digits 1-5 bilaterally  and preventative foot examination. Patient is medically managed  by Medicine/Hospitalists.  Patient denies any history of trauma to both feet. Patient has no other pedal complaints.  Patient is experiencing pain while standing, walking and in shoe gear.     PMH: No pertinent past medical history     PAST MEDICAL & SURGICAL HISTORY:  No pertinent past medical history      No significant past surgical history        PSH:No significant past surgical history      Allergies:No Known Allergies      Labs:                        11.4   8.82  )-----------( 323      ( 02 Sep 2024 05:50 )             32.9       WBC Trend  8.82 Date (09-02 @ 05:50)  9.97 Date (09-01 @ 09:12)  13.01<H> Date (08-31 @ 08:52)  17.25<H> Date (08-30 @ 11:40)      Chem  09-02    135  |  96<L>  |  8<L>  ----------------------------<  159<H>  3.5   |  26  |  0.6<L>    Ca    7.9<L>      02 Sep 2024 05:50  Phos  2.1     09-01  Mg     1.8     09-02    TPro  6.2  /  Alb  3.2<L>  /  TBili  0.8  /  DBili  x   /  AST  19  /  ALT  11  /  AlkPhos  77  08-31      T(F): 98.3 (09-02-24 @ 05:40), Max: 98.3 (09-02-24 @ 05:40)  HR: 81 (09-02-24 @ 05:40) (81 - 96)  BP: 125/72 (09-02-24 @ 05:40) (107/67 - 125/72)  RR: 18 (09-02-24 @ 05:40) (18 - 18)  SpO2: 99% (09-01-24 @ 21:03) (99% - 99%)  Wt(kg): --    Objective:   DERM:  Skin warm, dry and supple bilateral.  No open lesions or inter-digital macerations noted bilateral.   Toenails 1-5 Right and Left feet thickened, elongated, discolored, and dystrophic with subungual debris. There is pain upon palpation of all fungal and ingrowing nails 1-5 bilaterally.   VASC: Dorsalis Pedis and Posterior Tibial pulses 2/4.  Capillary re-fill time less than 3 seconds digits 1-5 bilateral.   NEURO: Protective sensation intact to the level of the digits bilateral.  MSK: Muscle strength 5/5 all major muscle groups bilateral. No structural abnormality, bilaterally    Assessment:   Bilateral dystrophic toenails consistent with  Onychomycosis.   Pain from Elongated nails, ingrowing, incurvated,  and dystrophic nails upon palpation of nails.  Xerosis of bilateral plantar feet.     Plan:   Discussed diagnosis and treatment with patient  Aseptic debridement and curettage of all fungal and ingrowing nails 1-5 bilateral with sterile nail nipper.  Discussed importance of daily foot examinations, drying of feet after bathing and proper shoe gear.  Patient Would Benefit From Periodic Debridements; Can Follow Up as Outpatient w/   Post Discharge   Discussed Plan w/ Attending; Dr. Reeder DPPIERCE    Spectra;    
PHILIPPE BALL  79y, Male  Allergies    No Known Allergies    Intolerances      LOS  2d    HPI  HPI:  80yo M with no known pmhx brought in by EMS after fall. Pt states he fell out of his recliner but currently has no pain. He is AOx2 and limited historian. Reports he was in his usual state of health prior to his fall . He lives at home alone and states he takes no medications. Currently endorsing a productive cough of (Sputum). Patient denies any chest pain, fever, nausea, vomiting, night sweats. Former smoker of 2 years. (occupation). No recent travel or sick contact.      In the ED, patient was hypoxic noted to be 90% on RA, was placed on 3L nasal cannula, and his O2 improved to 95%. Labs notable for leukocytosis of 17 and lactate of 3.0. CT Head/Spine negative. CT Chest is significant for upper and lower lobe reticulonodular and consolidative opacities.  Masslike opacity within the right lower lobe measuring up to 4.7 cm.  Enlarged right hilar lymph nodes. Differential includes neoplastic versus infectious etiologies. Patient was given vancomycin and cefepime due to suspicion of PNA.   (30 Aug 2024 15:42)      INFECTIOUS DISEASE HISTORY:  Hospital course-  ID consulted for antimicrobial recommendations.     Prior hospital charts reviewed [Yes]  Primary team notes reviewed [Yes]  Other consultant notes reviewed [Yes]    REVIEW OF SYSTEMS:  CONSTITUTIONAL: No fever or chills  HEENT: No sore throat  RESPIRATORY: No cough, no shortness of breath  CARDIOVASCULAR: No chest pain or palpitations  GASTROINTESTINAL: No abdominal or epigastric pain  GENITOURINARY: No dysuria  NEUROLOGICAL: No headache/dizziness  MSK: No joint pain, erythema, or swelling; no back pain  SKIN: No itching, rashes  All other ROS negative except noted above    PAST MEDICAL & SURGICAL HISTORY:  No pertinent past medical history      No significant past surgical history          SOCIAL HISTORY:  - No recent travel    FAMILY HISTORY: No pertinent PMH for first degree relatives.     ANTIMICROBIALS:  azithromycin  IVPB 500 daily  cefTRIAXone   IVPB 1000 every 24 hours      ANTIMICROBIALS (past 90 days):  MEDICATIONS  (STANDING):  azithromycin  IVPB   255 mL/Hr IV Intermittent (09-01-24 @ 12:23)   255 mL/Hr IV Intermittent (08-31-24 @ 12:17)   255 mL/Hr IV Intermittent (08-30-24 @ 21:07)    cefepime   IVPB   100 mL/Hr IV Intermittent (08-30-24 @ 12:26)    cefTRIAXone   IVPB   100 mL/Hr IV Intermittent (09-01-24 @ 15:57)   100 mL/Hr IV Intermittent (08-31-24 @ 16:35)   100 mL/Hr IV Intermittent (08-30-24 @ 16:43)    vancomycin  IVPB.   250 mL/Hr IV Intermittent (08-30-24 @ 14:09)        OTHER MEDS:   MEDICATIONS  (STANDING):  acetaminophen     Tablet .. 650 every 6 hours PRN  albuterol    0.083% 2.5 every 4 hours PRN  aluminum hydroxide/magnesium hydroxide/simethicone Suspension 30 every 4 hours PRN  dextrose 50% Injectable 12.5 once  dextrose 50% Injectable 25 once  dextrose 50% Injectable 25 once  dextrose Oral Gel 15 once PRN  enoxaparin Injectable 40 every 24 hours  glucagon  Injectable 1 once  insulin lispro (ADMELOG) corrective regimen sliding scale  three times a day before meals  insulin lispro (ADMELOG) corrective regimen sliding scale  at bedtime  melatonin 3 at bedtime PRN  ondansetron Injectable 4 every 8 hours PRN      VITALS:  Vital Signs Last 24 Hrs  T(F): 96.1 (09-01-24 @ 14:09), Max: 100.6 (08-30-24 @ 11:45)    Vital Signs Last 24 Hrs  HR: 96 (09-01-24 @ 14:09) (78 - 96)  BP: 117/71 (09-01-24 @ 14:09) (105/67 - 117/71)  RR: 18 (09-01-24 @ 14:09)  SpO2: --  Wt(kg): --    EXAM:  GENERAL: NAD on room air.  HEAD: No head lesions  NECK: Supple  CHEST/LUNG: Shallow breath sounds bilaterally.  HEART: S1 S2  ABDOMEN: Soft, nontender  EXTREMITIES: No clubbing  NERVOUS SYSTEM: Awake. Wants to sleep.  MSK: No joint erythema, swelling or pain  SKIN: No rashes or lesions, no superficial thrombophlebitis    Labs:                        11.6   9.97  )-----------( 285      ( 01 Sep 2024 09:12 )             34.1     09-01    137  |  97<L>  |  11  ----------------------------<  143<H>  3.5   |  28  |  0.6<L>    Ca    8.4      01 Sep 2024 09:12  Phos  2.1     09-01  Mg     2.1     09-01    TPro  6.2  /  Alb  3.2<L>  /  TBili  0.8  /  DBili  x   /  AST  19  /  ALT  11  /  AlkPhos  77  08-31      WBC Trend:  WBC Count: 9.97 (09-01-24 @ 09:12)  WBC Count: 13.01 (08-31-24 @ 08:52)  WBC Count: 17.25 (08-30-24 @ 11:40)      Auto Neutrophil #: 7.95 K/uL (09-01-24 @ 09:12)  Auto Neutrophil #: 11.23 K/uL (08-31-24 @ 08:52)  Auto Neutrophil #: 15.67 K/uL (08-30-24 @ 11:40)      Creatine Trend:  Creatinine: 0.6 (09-01)  Creatinine: 0.6 (08-31)  Creatinine: 0.8 (08-30)      Liver Biochemical Testing Trend:  Alanine Aminotransferase (ALT/SGPT): 11 (08-31)  Alanine Aminotransferase (ALT/SGPT): 11 (08-30)  Aspartate Aminotransferase (AST/SGOT): 19 (08-31-24 @ 08:52)  Aspartate Aminotransferase (AST/SGOT): 17 (08-30-24 @ 11:40)  Bilirubin Total: 0.8 (08-31)  Bilirubin Total: 0.8 (08-30)      Trend LDH      Auto Eosinophil %: 0.7 % (09-01-24 @ 09:12)  Auto Eosinophil %: 0.5 % (08-31-24 @ 08:52)  Auto Eosinophil %: 0.0 % (08-30-24 @ 11:40)      Urinalysis Basic - ( 01 Sep 2024 09:12 )    Color: x / Appearance: x / SG: x / pH: x  Gluc: 143 mg/dL / Ketone: x  / Bili: x / Urobili: x   Blood: x / Protein: x / Nitrite: x   Leuk Esterase: x / RBC: x / WBC x   Sq Epi: x / Non Sq Epi: x / Bacteria: x        MICROBIOLOGY:    Male    Culture - Blood (collected 30 Aug 2024 11:58)  Source: .Blood Blood-Peripheral  Preliminary Report (31 Aug 2024 22:01):    No growth at 24 hours    Culture - Blood (collected 30 Aug 2024 11:58)  Source: .Blood Blood-Peripheral  Preliminary Report (31 Aug 2024 22:01):    No growth at 24 hours  Procalcitonin: 32.90 (08-30)      Ferritin: 696 (08-31)            Lactate, Blood: 1.9 (08-30 @ 14:40)  Lactate, Blood: 3.0 (08-30 @ 11:40)    A1C with Estimated Average Glucose Result: 9.3 % (08-31-24 @ 08:52)      INFLAMMATORY MARKERS      RADIOLOGY & ADDITIONAL TESTS:  I have personally reviewed the imagings.  CXR  Xray Chest 1 View AP/PA:   ACC: [id number] EXAM:  XR CHEST 1 VIEW   ORDERED BY: LIZZETH KEMP     PROCEDURE DATE:  08/30/2024          INTERPRETATION:  Clinical History / Reason for exam: Fall    Comparison : Chest radiograph August 30, 2024.    Technique/Positioning: Frontal chest radiograph.    Findings/  impression:    Support devices: None.    Cardiac/mediastinum/hilum: Unremarkable.    Lung parenchyma/Pleura: Patchy bilateral opacities noted. No pneumothorax.    Skeleton/soft tissues: Degenerative changes of the spineand shoulders   seen.          --- End of Report ---            GREER BURLESON MD; Attending Radiologist  This document has been electronically signed. Aug 30 2024  2:02PM (08-30-24 @ 12:32)      CT  CT Abdomen and Pelvis w/ IV Cont:   ACC: [id number] EXAM:  CT ANGIO CHEST PULM ART WAWIC   ORDERED BY: LIZZETH KEMP     ACC: [id number] EXAM:  CT ABDOMEN AND PELVIS IC   ORDERED BY: LIZZETH KEMP     PROCEDURE DATE:  08/30/2024          INTERPRETATION:  CLINICAL INFORMATION: Trauma tochest, abdomen pelvis    COMPARISON: None.    CORRELATION:  XR CHEST 8/30/2024.    CONTRAST/COMPLICATIONS:  IV Contrast: IV contrast documented in unlinked concurrent exam   (accession [id number]), Omnipaque 350 (accession [id number])  90 cc   administered   10 cc discarded  Oral Contrast: NONE  Complications: None reported at time of study completion    PROCEDURE:  CT Angiography of the Chest was performed followed by portal venous phase   imaging of the Abdomen and Pelvis.  Sagittal and coronal reformats were performed as well as 3D (MIP)   reconstructions.    FINDINGS:  CHEST:  LUNGS AND LARGE AIRWAYS: Debris within the lower trachea and right main   bronchus, compatible with secretions. Upper lobe predominant,   centrilobular emphysematous changes. Extensive areas of reticulonodular   opacities within the bilateral upper and lower lobes. More confluent   solid appearing masslike opacities within the lower lobes. For example   right lower lobe masslike opacity measuring 4.7 x 2.3 cm with region of   central lucency (series 303, 153).  PLEURA: Bilateral pleural thickening and calcifications  VESSELS: Coronary artery calcifications.  HEART: Heart size is normal. Pericardial calcification.  MEDIASTINUM AND ARTURO: Multiple enlarged externaland perihilar lymph   nodes measuring up to 3.4 x 2.8 cm (series 303, image 113)  CHEST WALL AND LOWER NECK: Gynecomastia    ABDOMEN AND PELVIS:  LIVER: Within normal limits.  BILE DUCTS: Normal caliber.  GALLBLADDER: Distended gallbladder  SPLEEN: Within normal limits.  PANCREAS: Within normal limits.  ADRENALS: Within normal limits.  KIDNEYS/URETERS: No renal stones or hydronephrosis. Renal cysts and other   subcentimeter hypodensities, too small to further characterize    BLADDER: Distended urinary bladder  REPRODUCTIVE ORGANS: Obscured by streak artifact    BOWEL: No bowel obstruction. Colonic diverticulosis.  PERITONEUM/RETROPERITONEUM: Within normal limits.  VESSELS: Atherosclerotic changes.  LYMPH NODES: No lymphadenopathy.  ABDOMINAL WALL: Within normal limits.  BONES: Osteopenia. Status post right total hip arthroplasty and left   cephalomedullary nail. Severe left hip degenerative changes. Mild wedging   of the T12 vertebral body. Anasarca    IMPRESSION:    CHEST:    No CTA evidence of acute pulmonary embolus.    Upper and lower lobe reticulonodular and consolidative opacities.   Masslike opacity within the right lower lobe measuring up to 4.7 cm.   Enlarged right hilar lymph nodes. Differential includes neoplastic versus   infectious etiologies.    Age indeterminate however chronic appearing T12 mild compression deformity    ABDOMEN/PELVIS:    No CT evidence of acute traumatic injury within the abdomen or pelvis.    --- End of Report ---            MIROSLAVA HERNANDEZ MD; Attending Radiologist  This document has been electronically signed. Aug 30 2024  1:46PM (08-30-24 @ 13:18)      CARDIOLOGY TESTING  12 Lead ECG:   Ventricular Rate 100 BPM    Atrial Rate 100 BPM    P-R Interval 188 ms    QRS Duration 136 ms    Q-T Interval 382 ms    QTC Calculation(Bazett) 492 ms    P Axis 80 degrees    R Axis -6 degrees    T Axis 33 degrees    Diagnosis Line Normal sinus rhythm  Right bundle branch block  Abnormal ECG    Confirmed by Dinh Angulo ([id number]) on 8/30/2024 3:35:24 PM (08-30-24 @ 12:26)

## 2024-09-03 ENCOUNTER — TRANSCRIPTION ENCOUNTER (OUTPATIENT)
Age: 79
End: 2024-09-03

## 2024-09-03 VITALS
RESPIRATION RATE: 18 BRPM | DIASTOLIC BLOOD PRESSURE: 68 MMHG | OXYGEN SATURATION: 97 % | SYSTOLIC BLOOD PRESSURE: 117 MMHG | TEMPERATURE: 99 F | HEART RATE: 89 BPM

## 2024-09-03 LAB
ANION GAP SERPL CALC-SCNC: 12 MMOL/L — SIGNIFICANT CHANGE UP (ref 7–14)
BUN SERPL-MCNC: 8 MG/DL — LOW (ref 10–20)
CALCIUM SERPL-MCNC: 8.2 MG/DL — LOW (ref 8.4–10.5)
CHLORIDE SERPL-SCNC: 98 MMOL/L — SIGNIFICANT CHANGE UP (ref 98–110)
CO2 SERPL-SCNC: 25 MMOL/L — SIGNIFICANT CHANGE UP (ref 17–32)
CREAT SERPL-MCNC: 0.5 MG/DL — LOW (ref 0.7–1.5)
EGFR: 104 ML/MIN/1.73M2 — SIGNIFICANT CHANGE UP
GLUCOSE BLDC GLUCOMTR-MCNC: 137 MG/DL — HIGH (ref 70–99)
GLUCOSE BLDC GLUCOMTR-MCNC: 150 MG/DL — HIGH (ref 70–99)
GLUCOSE BLDC GLUCOMTR-MCNC: 182 MG/DL — HIGH (ref 70–99)
GLUCOSE BLDC GLUCOMTR-MCNC: 202 MG/DL — HIGH (ref 70–99)
GLUCOSE SERPL-MCNC: 141 MG/DL — HIGH (ref 70–99)
HCT VFR BLD CALC: 33.7 % — LOW (ref 42–52)
HGB BLD-MCNC: 11.9 G/DL — LOW (ref 14–18)
MAGNESIUM SERPL-MCNC: 2.2 MG/DL — SIGNIFICANT CHANGE UP (ref 1.8–2.4)
MCHC RBC-ENTMCNC: 33.1 PG — HIGH (ref 27–31)
MCHC RBC-ENTMCNC: 35.3 G/DL — SIGNIFICANT CHANGE UP (ref 32–37)
MCV RBC AUTO: 93.9 FL — SIGNIFICANT CHANGE UP (ref 80–94)
NRBC # BLD: 0 /100 WBCS — SIGNIFICANT CHANGE UP (ref 0–0)
PHOSPHATE SERPL-MCNC: 3 MG/DL — SIGNIFICANT CHANGE UP (ref 2.1–4.9)
PLATELET # BLD AUTO: 348 K/UL — SIGNIFICANT CHANGE UP (ref 130–400)
PMV BLD: 8.9 FL — SIGNIFICANT CHANGE UP (ref 7.4–10.4)
POTASSIUM SERPL-MCNC: 3.7 MMOL/L — SIGNIFICANT CHANGE UP (ref 3.5–5)
POTASSIUM SERPL-SCNC: 3.7 MMOL/L — SIGNIFICANT CHANGE UP (ref 3.5–5)
RBC # BLD: 3.59 M/UL — LOW (ref 4.7–6.1)
RBC # FLD: 13.2 % — SIGNIFICANT CHANGE UP (ref 11.5–14.5)
SODIUM SERPL-SCNC: 135 MMOL/L — SIGNIFICANT CHANGE UP (ref 135–146)
WBC # BLD: 11.84 K/UL — HIGH (ref 4.8–10.8)
WBC # FLD AUTO: 11.84 K/UL — HIGH (ref 4.8–10.8)

## 2024-09-03 PROCEDURE — 99239 HOSP IP/OBS DSCHRG MGMT >30: CPT

## 2024-09-03 RX ORDER — CEFPODOXIME PROXETIL 100 MG/5ML
1 GRANULE, FOR SUSPENSION ORAL
Qty: 4 | Refills: 0
Start: 2024-09-03 | End: 2024-09-04

## 2024-09-03 RX ORDER — METFORMIN HYDROCHLORIDE 850 MG/1
1 TABLET, FILM COATED ORAL
Qty: 0 | Refills: 0 | DISCHARGE

## 2024-09-03 RX ORDER — ACETAMINOPHEN 325 MG/1
2 TABLET ORAL
Qty: 0 | Refills: 0 | DISCHARGE
Start: 2024-09-03

## 2024-09-03 RX ADMIN — Medication 2: at 12:00

## 2024-09-03 RX ADMIN — Medication 1: at 16:44

## 2024-09-03 RX ADMIN — Medication 100 MILLIGRAM(S): at 16:47

## 2024-09-03 NOTE — DISCHARGE NOTE PROVIDER - CARE PROVIDERS DIRECT ADDRESSES
,[email or web address]

## 2024-09-03 NOTE — DISCHARGE NOTE NURSING/CASE MANAGEMENT/SOCIAL WORK - PATIENT PORTAL LINK FT
You can access the FollowMyHealth Patient Portal offered by [facility] by registering at the following website: [email or web address]. By joining [employer]’s FollowMyHealth portal, you will also be able to view your health information using other applications (apps) compatible with our system.

## 2024-09-03 NOTE — PROGRESS NOTE ADULT - SUBJECTIVE AND OBJECTIVE BOX
LENGTH OF HOSPITAL STAY: 2d    CHIEF COMPLAINT:    Patient is a 79y old  Male who presents with a chief complaint of Fall; Pneumonia; Safe dispo (31 Aug 2024 14:42)    OVERNIGHT EVENTS:    - no overnight events  - pt examined at bedside, endorses feeling better  - discussed plan, answered questions  - spoke with daughter     FOLLOW UP:    - ID cs  - dispo planning - needs snf    HPI:    78yo M with no known pmhx brought in by EMS after fall. Pt states he fell out of his recliner but currently has no pain. He is AOx2 and limited historian. Reports he was in his usual state of health prior to his fall . He lives at home alone and states he takes no medications. Currently endorsing a productive cough of (Sputum). Patient denies any chest pain, fever, nausea, vomiting, night sweats. Former smoker of 2 years. (occupation). No recent travel or sick contact.      In the ED, patient was hypoxic noted to be 90% on RA, was placed on 3L nasal cannula, and his O2 improved to 95%. Labs notable for leukocytosis of 17 and lactate of 3.0. CT Head/Spine negative. CT Chest is significant for upper and lower lobe reticulonodular and consolidative opacities.  Masslike opacity within the right lower lobe measuring up to 4.7 cm.  Enlarged right hilar lymph nodes. Differential includes neoplastic versus infectious etiologies. Patient was given vancomycin and cefepime due to suspicion of PNA.   (30 Aug 2024 15:42)      ALLERGIES:    Allergies    No Known Allergies    Intolerances        PMHx:    PAST MEDICAL & SURGICAL HISTORY:  No pertinent past medical history      No significant past surgical history      SOCIAL Hx:    - pt relative is currently at [employer], [facility] would be good dispo for pt  - former smoker lives alone    MEDICATIONS:  STANDING MEDICATIONS  azithromycin  IVPB 500 milliGRAM(s) IV Intermittent daily  cefTRIAXone   IVPB 1000 milliGRAM(s) IV Intermittent every 24 hours  dextrose 5%. 1000 milliLiter(s) IV Continuous <Continuous>  dextrose 5%. 1000 milliLiter(s) IV Continuous <Continuous>  dextrose 50% Injectable 12.5 Gram(s) IV Push once  dextrose 50% Injectable 25 Gram(s) IV Push once  dextrose 50% Injectable 25 Gram(s) IV Push once  enoxaparin Injectable 40 milliGRAM(s) SubCutaneous every 24 hours  glucagon  Injectable 1 milliGRAM(s) IntraMuscular once  insulin lispro (ADMELOG) corrective regimen sliding scale   SubCutaneous at bedtime  insulin lispro (ADMELOG) corrective regimen sliding scale   SubCutaneous three times a day before meals  potassium phosphate / sodium phosphate Powder (PHOS-NaK) 1 Packet(s) Oral four times a day before meals    PRN MEDICATIONS  acetaminophen     Tablet .. 650 milliGRAM(s) Oral every 6 hours PRN  albuterol    0.083% 2.5 milliGRAM(s) Nebulizer every 4 hours PRN  aluminum hydroxide/magnesium hydroxide/simethicone Suspension 30 milliLiter(s) Oral every 4 hours PRN  dextrose Oral Gel 15 Gram(s) Oral once PRN  melatonin 3 milliGRAM(s) Oral at bedtime PRN  ondansetron Injectable 4 milliGRAM(s) IV Push every 8 hours PRN      LABS:                        11.6   9.97  )-----------( 285      ( 01 Sep 2024 09:12 )             34.1     09-01    137  |  97<L>  |  11  ----------------------------<  143<H>  3.5   |  28  |  0.6<L>    Ca    8.4      01 Sep 2024 09:12  Phos  2.1     09-01  Mg     2.1     09-01    TPro  6.2  /  Alb  3.2<L>  /  TBili  0.8  /  DBili  x   /  AST  19  /  ALT  11  /  AlkPhos  77  08-31      Urinalysis Basic - ( 01 Sep 2024 09:12 )    Color: x / Appearance: x / SG: x / pH: x  Gluc: 143 mg/dL / Ketone: x  / Bili: x / Urobili: x   Blood: x / Protein: x / Nitrite: x   Leuk Esterase: x / RBC: x / WBC x   Sq Epi: x / Non Sq Epi: x / Bacteria: x            Culture - Blood (collected 30 Aug 2024 11:58)  Source: .Blood Blood-Peripheral  Preliminary Report (31 Aug 2024 22:01):    No growth at 24 hours    Culture - Blood (collected 30 Aug 2024 11:58)  Source: .Blood Blood-Peripheral  Preliminary Report (31 Aug 2024 22:01):    No growth at 24 hours      VITALS:   T(F): 96.1  HR: 96  BP: 117/71  RR: 18  SpO2: --      PHYSICAL EXAM:    Gen: NAD, resting in bed, thin frail  HEENT: Normocephalic, atraumatic, temporal wasting  Neck: no lymphadenopathy no jvd  CV: Regular rate & regular rhythm  Lungs: decreased BS at bases, no fremitus  Abdomen: Soft, BS present, scaphoid  Ext: Warm, well perfused  Neuro: moves all extremities against resistance, no droop, awake  Skin: no rash, no erythema  
LENGTH OF HOSPITAL STAY: 1d    CHIEF COMPLAINT:    Patient is a 79y old  Male who presents with a chief complaint of Fall; Pneumonia; Safe dispo (31 Aug 2024 14:42)    OVERNIGHT EVENTS:    - no overnight events  - pt examined at bedside, endorses feeling better  - discussed plan, answered questions    FOLLOW UP:    - ID cs    HPI:    78yo M with no known pmhx brought in by EMS after fall. Pt states he fell out of his recliner but currently has no pain. He is AOx2 and limited historian. Reports he was in his usual state of health prior to his fall . He lives at home alone and states he takes no medications. Currently endorsing a productive cough of (Sputum). Patient denies any chest pain, fever, nausea, vomiting, night sweats. Former smoker of 2 years. (occupation). No recent travel or sick contact.      In the ED, patient was hypoxic noted to be 90% on RA, was placed on 3L nasal cannula, and his O2 improved to 95%. Labs notable for leukocytosis of 17 and lactate of 3.0. CT Head/Spine negative. CT Chest is significant for upper and lower lobe reticulonodular and consolidative opacities.  Masslike opacity within the right lower lobe measuring up to 4.7 cm.  Enlarged right hilar lymph nodes. Differential includes neoplastic versus infectious etiologies. Patient was given vancomycin and cefepime due to suspicion of PNA.   (30 Aug 2024 15:42)      ALLERGIES:    Allergies    No Known Allergies    Intolerances        PMHx:    PAST MEDICAL & SURGICAL HISTORY:  No pertinent past medical history      No significant past surgical history      SOCIAL Hx:    - former smoker lives alone    MEDICATIONS:  STANDING MEDICATIONS  azithromycin  IVPB 500 milliGRAM(s) IV Intermittent daily  cefTRIAXone   IVPB 1000 milliGRAM(s) IV Intermittent every 24 hours  dextrose 5%. 1000 milliLiter(s) IV Continuous <Continuous>  dextrose 5%. 1000 milliLiter(s) IV Continuous <Continuous>  dextrose 50% Injectable 12.5 Gram(s) IV Push once  dextrose 50% Injectable 25 Gram(s) IV Push once  dextrose 50% Injectable 25 Gram(s) IV Push once  enoxaparin Injectable 40 milliGRAM(s) SubCutaneous every 24 hours  glucagon  Injectable 1 milliGRAM(s) IntraMuscular once  insulin lispro (ADMELOG) corrective regimen sliding scale   SubCutaneous three times a day before meals  insulin lispro (ADMELOG) corrective regimen sliding scale   SubCutaneous at bedtime  magnesium sulfate  IVPB 2 Gram(s) IV Intermittent every 2 hours  potassium phosphate / sodium phosphate Powder (PHOS-NaK) 1 Packet(s) Oral four times a day before meals    PRN MEDICATIONS  acetaminophen     Tablet .. 650 milliGRAM(s) Oral every 6 hours PRN  albuterol    0.083% 2.5 milliGRAM(s) Nebulizer every 4 hours PRN  aluminum hydroxide/magnesium hydroxide/simethicone Suspension 30 milliLiter(s) Oral every 4 hours PRN  dextrose Oral Gel 15 Gram(s) Oral once PRN  melatonin 3 milliGRAM(s) Oral at bedtime PRN  ondansetron Injectable 4 milliGRAM(s) IV Push every 8 hours PRN      LABS:                        12.2   13.01 )-----------( 280      ( 31 Aug 2024 08:52 )             35.4     08-31    138  |  97<L>  |  21<H>  ----------------------------<  155<H>  3.0<L>   |  27  |  0.6<L>    Ca    9.2      31 Aug 2024 08:52  Phos  1.7     08-31  Mg     1.5     08-31    TPro  6.2  /  Alb  3.2<L>  /  TBili  0.8  /  DBili  x   /  AST  19  /  ALT  11  /  AlkPhos  77  08-31    PT/INR - ( 30 Aug 2024 11:40 )   PT: 12.50 sec;   INR: 1.10 ratio         PTT - ( 30 Aug 2024 11:40 )  PTT:35.5 sec  Urinalysis Basic - ( 31 Aug 2024 08:52 )    Color: x / Appearance: x / SG: x / pH: x  Gluc: 155 mg/dL / Ketone: x  / Bili: x / Urobili: x   Blood: x / Protein: x / Nitrite: x   Leuk Esterase: x / RBC: x / WBC x   Sq Epi: x / Non Sq Epi: x / Bacteria: x      VITALS:   T(F): 96.1  HR: 91  BP: 107/68  RR: 18  SpO2: 95%        PHYSICAL EXAM:    Gen: NAD, resting in bed, thin frail  HEENT: Normocephalic, atraumatic, temporal wasting  Neck: no lymphadenopathy no jvd  CV: Regular rate & regular rhythm  Lungs: decreased BS at bases, no fremitus  Abdomen: Soft, BS present, scaphoid  Ext: Warm, well perfused  Neuro: moves all extremities against resistance, no droop, awake  Skin: no rash, no erythema  
PHILIPPE BALL  79y, Male    LOS  4d    INTERVAL EVENTS/HPI  - No acute events overnight  - T(F): , Max: 98.6 (09-02-24 @ 20:25)  - WBC Count: 11.84 (09-03-24 @ 06:42)  WBC Count: 8.82 (09-02-24 @ 05:50)  - Creatinine: 0.5 (09-03-24 @ 06:42)  Creatinine: 0.6 (09-02-24 @ 05:50)  REVIEW OF SYSTEMS:  CONSTITUTIONAL: No fever or chills  HEENT: No sore throat  RESPIRATORY: No cough, no shortness of breath  CARDIOVASCULAR: No chest pain or palpitations  GASTROINTESTINAL: No abdominal or epigastric pain  GENITOURINARY: No dysuria  NEUROLOGICAL: No headache/dizziness  MSK: No joint pain, erythema, or swelling; no back pain  SKIN: No itching, rashes  All other ROS negative except noted above    Prior hospital charts reviewed [Yes]  Primary team notes reviewed [Yes]  Other consultant notes reviewed [Yes]    ANTIMICROBIALS:   cefTRIAXone   IVPB 1000 every 24 hours  MEDICATIONS  (STANDING):  azithromycin  IVPB   255 mL/Hr IV Intermittent (09-02-24 @ 11:47)   255 mL/Hr IV Intermittent (09-01-24 @ 12:23)   255 mL/Hr IV Intermittent (08-31-24 @ 12:17)   255 mL/Hr IV Intermittent (08-30-24 @ 21:07)    cefepime   IVPB   100 mL/Hr IV Intermittent (08-30-24 @ 12:26)    cefTRIAXone   IVPB   100 mL/Hr IV Intermittent (09-02-24 @ 17:40)   100 mL/Hr IV Intermittent (09-01-24 @ 15:57)   100 mL/Hr IV Intermittent (08-31-24 @ 16:35)   100 mL/Hr IV Intermittent (08-30-24 @ 16:43)    vancomycin  IVPB.   250 mL/Hr IV Intermittent (08-30-24 @ 14:09)    OTHER MEDS: MEDICATIONS  (STANDING):  acetaminophen     Tablet .. 650 every 6 hours PRN  albuterol    0.083% 2.5 every 4 hours PRN  aluminum hydroxide/magnesium hydroxide/simethicone Suspension 30 every 4 hours PRN  dextrose 50% Injectable 12.5 once  dextrose 50% Injectable 25 once  dextrose 50% Injectable 25 once  dextrose Oral Gel 15 once PRN  enoxaparin Injectable 40 every 24 hours  glucagon  Injectable 1 once  insulin lispro (ADMELOG) corrective regimen sliding scale  three times a day before meals  insulin lispro (ADMELOG) corrective regimen sliding scale  at bedtime  melatonin 3 at bedtime PRN  ondansetron Injectable 4 every 8 hours PRN      Vital Signs Last 24 Hrs  T(F): 98.3 (09-03-24 @ 05:00), Max: 100.6 (08-30-24 @ 11:45)    Vital Signs Last 24 Hrs  HR: 79 (09-03-24 @ 05:00) (77 - 84)  BP: 126/74 (09-03-24 @ 05:00) (106/65 - 126/74)  RR: 18 (09-03-24 @ 05:00)  SpO2: 96% (09-03-24 @ 05:00) (95% - 96%)  Wt(kg): --    EXAM:  GENERAL: NAD on room air.  HEAD: No head lesions  NECK: Supple  CHEST/LUNG: Shallow breath sounds bilaterally.  HEART: S1 S2  ABDOMEN: Soft, nontender  EXTREMITIES: No clubbing  NERVOUS SYSTEM: Awake. Wants to sleep.  MSK: No joint erythema, swelling or pain  SKIN: No rashes or lesions, no superficial thrombophlebitis    Labs:                        11.9   11.84 )-----------( 348      ( 03 Sep 2024 06:42 )             33.7     09-03    135  |  98  |  8<L>  ----------------------------<  141<H>  3.7   |  25  |  0.5<L>    Ca    8.2<L>      03 Sep 2024 06:42  Phos  3.0     09-03  Mg     2.2     09-03        WBC Trend:  WBC Count: 11.84 (09-03-24 @ 06:42)  WBC Count: 8.82 (09-02-24 @ 05:50)  WBC Count: 9.97 (09-01-24 @ 09:12)  WBC Count: 13.01 (08-31-24 @ 08:52)      Creatine Trend:  Creatinine: 0.5 (09-03)  Creatinine: 0.6 (09-02)  Creatinine: 0.6 (09-01)  Creatinine: 0.6 (08-31)      Liver Biochemical Testing Trend:  Alanine Aminotransferase (ALT/SGPT): 11 (08-31)  Alanine Aminotransferase (ALT/SGPT): 11 (08-30)  Aspartate Aminotransferase (AST/SGOT): 19 (08-31-24 @ 08:52)  Aspartate Aminotransferase (AST/SGOT): 17 (08-30-24 @ 11:40)  Bilirubin Total: 0.8 (08-31)  Bilirubin Total: 0.8 (08-30)      Trend LDH      Urinalysis Basic - ( 03 Sep 2024 06:42 )    Color: x / Appearance: x / SG: x / pH: x  Gluc: 141 mg/dL / Ketone: x  / Bili: x / Urobili: x   Blood: x / Protein: x / Nitrite: x   Leuk Esterase: x / RBC: x / WBC x   Sq Epi: x / Non Sq Epi: x / Bacteria: x        MICROBIOLOGY:    Male    Culture - Blood (collected 30 Aug 2024 11:58)  Source: .Blood Blood-Peripheral  Preliminary Report:    No growth at 72 Hours    Culture - Blood (collected 30 Aug 2024 11:58)  Source: .Blood Blood-Peripheral  Preliminary Report:    No growth at 72 Hours    Procalcitonin: 32.90 (08-30)      Ferritin: 696 (08-31)      RADIOLOGY & ADDITIONAL TESTS:  I have personally reviewed the relevant images.   CXR      CT    < from: CT Angio Chest PE Protocol w/ IV Cont (08.30.24 @ 13:18) >  IMPRESSION:    CHEST:    No CTA evidence of acute pulmonary embolus.    Upper and lower lobe reticulonodular and consolidative opacities.   Masslike opacity within the right lower lobe measuring up to 4.7 cm.   Enlarged right hilar lymph nodes. Differential includes neoplastic versus   infectious etiologies.    Age indeterminate however chronic appearing T12 mild compression deformity    ABDOMEN/PELVIS:    No CT evidence of acute traumatic injury within the abdomen or pelvis.    --- End of Report ---      < end of copied text >      WEIGHT  Weight (kg): 65.8 (08-30-24 @ 11:19)  Creatinine: 0.5 mg/dL (09-03-24 @ 06:42)      All available historical records have been reviewed      
LENGTH OF HOSPITAL STAY: 3d    CHIEF COMPLAINT:    Patient is a 79y old  Male who presents with a chief complaint of Fall; Pneumonia; Safe dispo (31 Aug 2024 14:42)    OVERNIGHT EVENTS:    - no overnight events  - pt examined at bedside, endorses feeling better  - discussed plan, answered questions    FOLLOW UP:    - dispo planning - needs snf    HPI:    80yo M with no known pmhx brought in by EMS after fall. Pt states he fell out of his recliner but currently has no pain. He is AOx2 and limited historian. Reports he was in his usual state of health prior to his fall . He lives at home alone and states he takes no medications. Currently endorsing a productive cough of (Sputum). Patient denies any chest pain, fever, nausea, vomiting, night sweats. Former smoker of 2 years. (occupation). No recent travel or sick contact.      In the ED, patient was hypoxic noted to be 90% on RA, was placed on 3L nasal cannula, and his O2 improved to 95%. Labs notable for leukocytosis of 17 and lactate of 3.0. CT Head/Spine negative. CT Chest is significant for upper and lower lobe reticulonodular and consolidative opacities.  Masslike opacity within the right lower lobe measuring up to 4.7 cm.  Enlarged right hilar lymph nodes. Differential includes neoplastic versus infectious etiologies. Patient was given vancomycin and cefepime due to suspicion of PNA.   (30 Aug 2024 15:42)      ALLERGIES:    Allergies    No Known Allergies    Intolerances        PMHx:    PAST MEDICAL & SURGICAL HISTORY:  No pertinent past medical history      No significant past surgical history      SOCIAL Hx:    - pt relative is currently at [employer], [employer] would be good dispo for pt  - former smoker lives alone    MEDICATIONS:  STANDING MEDICATIONS  cefTRIAXone   IVPB 1000 milliGRAM(s) IV Intermittent every 24 hours  dextrose 5%. 1000 milliLiter(s) IV Continuous <Continuous>  dextrose 5%. 1000 milliLiter(s) IV Continuous <Continuous>  dextrose 50% Injectable 25 Gram(s) IV Push once  dextrose 50% Injectable 25 Gram(s) IV Push once  dextrose 50% Injectable 12.5 Gram(s) IV Push once  enoxaparin Injectable 40 milliGRAM(s) SubCutaneous every 24 hours  glucagon  Injectable 1 milliGRAM(s) IntraMuscular once  insulin lispro (ADMELOG) corrective regimen sliding scale   SubCutaneous at bedtime  insulin lispro (ADMELOG) corrective regimen sliding scale   SubCutaneous three times a day before meals  magnesium sulfate  IVPB 2 Gram(s) IV Intermittent every 2 hours    PRN MEDICATIONS  acetaminophen     Tablet .. 650 milliGRAM(s) Oral every 6 hours PRN  albuterol    0.083% 2.5 milliGRAM(s) Nebulizer every 4 hours PRN  aluminum hydroxide/magnesium hydroxide/simethicone Suspension 30 milliLiter(s) Oral every 4 hours PRN  dextrose Oral Gel 15 Gram(s) Oral once PRN  melatonin 3 milliGRAM(s) Oral at bedtime PRN  ondansetron Injectable 4 milliGRAM(s) IV Push every 8 hours PRN      LABS:                        11.4   8.82  )-----------( 323      ( 02 Sep 2024 05:50 )             32.9     09-02    135  |  96<L>  |  8<L>  ----------------------------<  159<H>  3.5   |  26  |  0.6<L>    Ca    7.9<L>      02 Sep 2024 05:50  Phos  2.1     09-01  Mg     1.8     09-02        Urinalysis Basic - ( 02 Sep 2024 05:50 )    Color: x / Appearance: x / SG: x / pH: x  Gluc: 159 mg/dL / Ketone: x  / Bili: x / Urobili: x   Blood: x / Protein: x / Nitrite: x   Leuk Esterase: x / RBC: x / WBC x   Sq Epi: x / Non Sq Epi: x / Bacteria: x      VITALS:   T(F): 98  HR: 84  BP: 106/65  RR: 18  SpO2: 95%      PHYSICAL EXAM:    Gen: NAD, resting in bed, thin frail  HEENT: Normocephalic, atraumatic, temporal wasting  Neck: no lymphadenopathy no jvd  CV: Regular rate & regular rhythm  Lungs: decreased BS at bases, no fremitus  Abdomen: Soft, BS present, scaphoid  Ext: Warm, well perfused; feet s/p pod db  Neuro: moves all extremities against resistance, no droop, awake  Skin: no rash, no erythema

## 2024-09-03 NOTE — DISCHARGE NOTE PROVIDER - DETAILS OF MALNUTRITION DIAGNOSIS/DIAGNOSES
Amol Youssef from patients job with [employer] is calling regarding patients [place] paperwork. She can be reached at [phone] or [phone].
Called and checked with Mr. Haven Crane to make sure he is okay with me talking to Ms. Ilan Serrato about [facility] work and he said that was okay with him. Left message for Ms. Ilan Serrato with [employer].
This patient has been assessed with a concern for Malnutrition and was treated during this hospitalization for the following Nutrition diagnosis/diagnoses:     -  09/01/2024: Severe protein-calorie malnutrition

## 2024-09-03 NOTE — DISCHARGE NOTE PROVIDER - CARE PROVIDER_API CALL
Alden Perez  Pulmonary Disease  [address], [address]  [place] [address]  Phone: [phone]  Fax: [phone]  Follow Up Time:     Martinez Reeder  Podiatric Medicine and Surgery  [address] [place] [address]  Phone: [phone]  Fax: [phone]  Follow Up Time:

## 2024-09-03 NOTE — DISCHARGE NOTE PROVIDER - ATTENDING DISCHARGE PHYSICAL EXAMINATION:
Gen: NAD, resting in bed, thin frail  HEENT: Normocephalic, atraumatic, temporal wasting  Neck: no lymphadenopathy no jvd  CV: Regular rate & regular rhythm  Lungs: decreased BS at bases, no fremitus  Abdomen: Soft, BS present, scaphoid  Ext: Warm, well perfused; feet s/p pod db  Neuro: moves all extremities against resistance, no droop, awake  Skin: no rash, no erythema

## 2024-09-03 NOTE — DISCHARGE NOTE NURSING/CASE MANAGEMENT/SOCIAL WORK - NSDCPEFALRISK_GEN_ALL_CORE
For information on Fall & Injury Prevention, visit: [email or web address] OR  [email or web address] OR  https://www.cdc.gov/steadi/patient.html

## 2024-09-03 NOTE — DISCHARGE NOTE PROVIDER - HOSPITAL COURSE
78yo M with no known pmhx brought in by EMS after fall. Pt states he fell out of his recliner but currently has no pain. He is AOx2 and limited historian. Reports he was in his usual state of health prior to his fall . He lives at home alone and states he takes no medications. Currently endorsing a productive cough of (Sputum). Patient denies any chest pain, fever, nausea, vomiting, night sweats. Former smoker of 2 years. (occupation). No recent travel or sick contact.      In the ED, patient was hypoxic noted to be 90% on RA, was placed on 3L nasal cannula, and his O2 improved to 95%. Labs notable for leukocytosis of 17 and lactate of 3.0. CT Head/Spine negative. CT Chest is significant for upper and lower lobe reticulonodular and consolidative opacities.  Masslike opacity within the right lower lobe measuring up to 4.7 cm.  Enlarged right hilar lymph nodes. Differential includes neoplastic versus infectious etiologies. Patient was given vancomycin and cefepime due to suspicion of PNA.   (30 Aug 2024 15:42)    Pt eval'd by pulm and ID; decision made to treat pneumonia and then repeat imaging in 4-6 weeks and f/u OP pulm;    Of note pt with limited prior medical contact, needs age appropriate cancer screening, fth A1c >9, started on metformin and carb consistent diet easy to chew but needs to be rechecked in 3 months.    Pt HD stable, mildly confused, spoke w/ daughter and updated on plan;     Will be d/c'd to [facility] ([id number] and [id number]) billing is not performed here. Please use the Immunohistochemistry Stain Billing plan to accomplish this.

## 2024-09-03 NOTE — DISCHARGE NOTE PROVIDER - NSDCCPCAREPLAN_GEN_ALL_CORE_FT
PRINCIPAL DISCHARGE DIAGNOSIS  Diagnosis: Pneumonia  Assessment and Plan of Treatment: You were brought to the hospital after a fall at home. You were found to have a likely pneumonia, but on imaging some possible masses were also discovered. You were evaluated by our pulmonology team. You will complete a course of antibiotics and then follow up with pulmonology for repeat imaging in 4-6 weeks and possible biopsy.      SECONDARY DISCHARGE DIAGNOSES  Diagnosis: Pneumonia  Assessment and Plan of Treatment:     Diagnosis: Frequent falls  Assessment and Plan of Treatment:

## 2024-09-03 NOTE — DISCHARGE NOTE PROVIDER - NSDCMRMEDTOKEN_GEN_ALL_CORE_FT
acetaminophen 325 mg oral tablet: 2 tab(s) orally every 6 hours As needed Temp greater or equal to 38C (100.4F), Mild Pain (1 - 3)  Albuterol (Eqv-ProAir HFA) 90 mcg/inh inhalation aerosol: 2 puff(s) inhaled 4 times a day as needed for  shortness of breath and/or wheezing  cefpodoxime 200 mg oral tablet: 1 tab(s) orally 2 times a day  Melatonin 3 mg oral tablet: 1 tab(s) orally once a day (at bedtime) As needed Insomnia  metFORMIN 500 mg oral tablet: 1 tab(s) orally 2 times a day

## 2024-09-03 NOTE — PROGRESS NOTE ADULT - ASSESSMENT
This is a 79 year old M with no known pmhx brought in by EMS after fall.    IMPRESSION  #Concern for CAP  #Emphysema, reticulonodular and consolidative opacities.   #Mass like opacity with in right lower lobe measuring upto 4.7 cm. Lymph node enlargement-Possible neoplastic process.   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  MRSA negative    RECOMMENDATIONS  -Reasonable to keep on IV ceftriaxone 1 gram daily (5 days) +Azithromycin 500mg q 24 hours for 3 days.   -Outpatient follow up with pulmonary for repeat CT chest and lung mass workup.  -Goals of care.     If any questions, please send a message or call on [employer]  Please continue to update ID with any pertinent new laboratory or radiographic findings.    Abran Velázquez M.D  Infectious Diseases Attending/   [employer] at \[facility]\""/[facility]   This is a 79 year old M with no known pmhx brought in by EMS after fall.    IMPRESSION  #Concern for CAP  #Emphysema, reticulonodular and consolidative opacities.   #Mass like opacity with in right lower lobe measuring upto 4.7 cm. Lymph node enlargement-Possible neoplastic process.   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  MRSA negative    RECOMMENDATIONS  -Reasonable to keep on IV ceftriaxone 1 gram daily (5 days) +Azithromycin 500mg q 24 hours for 3 days. Can switch to PO cefpodoxime 200mg BID at discharge.  -Outpatient follow up with pulmonary for repeat CT chest and lung mass workup.  -Goals of care.     If any questions, please send a message or call on [employer]  Please continue to update ID with any pertinent new laboratory or radiographic findings.    Abran Velázquez M.D  Infectious Diseases Attending/   [employer] at [facility]

## 2024-09-03 NOTE — SWALLOW BEDSIDE ASSESSMENT ADULT - SWALLOW EVAL: DIAGNOSIS
+toleration observed w/o s/s of aspiration/penetration for Easy to chew consistency and thin liquids
+toleration observed w/o s/s of aspiration/penetration for Easy to chew consistency and thin liquids

## 2024-09-03 NOTE — SWALLOW BEDSIDE ASSESSMENT ADULT - COMMENTS
CT Chest: CHEST: No CTA evidence of acute pulmonary embolus. Upper and lower lobe reticulonodular and consolidative opacities. Masslike opacity within the right lower lobe measuring up to 4.7 cm. Enlarged right hilar lymph nodes. Differential includes neoplastic versus infectious etiologies.

## 2024-09-04 LAB
CULTURE RESULTS: SIGNIFICANT CHANGE UP
CULTURE RESULTS: SIGNIFICANT CHANGE UP
SPECIMEN SOURCE: SIGNIFICANT CHANGE UP
SPECIMEN SOURCE: SIGNIFICANT CHANGE UP

## 2024-09-09 DIAGNOSIS — R65.20 SEVERE SEPSIS WITHOUT SEPTIC SHOCK: ICD-10-CM

## 2024-09-09 DIAGNOSIS — Z91.81 HISTORY OF FALLING: ICD-10-CM

## 2024-09-09 DIAGNOSIS — Z79.84 LONG TERM (CURRENT) USE OF ORAL HYPOGLYCEMIC DRUGS: ICD-10-CM

## 2024-09-09 DIAGNOSIS — N62 HYPERTROPHY OF BREAST: ICD-10-CM

## 2024-09-09 DIAGNOSIS — R60.1 GENERALIZED EDEMA: ICD-10-CM

## 2024-09-09 DIAGNOSIS — R59.0 LOCALIZED ENLARGED LYMPH NODES: ICD-10-CM

## 2024-09-09 DIAGNOSIS — E11.65 TYPE 2 DIABETES MELLITUS WITH HYPERGLYCEMIA: ICD-10-CM

## 2024-09-09 DIAGNOSIS — B35.1 TINEA UNGUIUM: ICD-10-CM

## 2024-09-09 DIAGNOSIS — I45.10 UNSPECIFIED RIGHT BUNDLE-BRANCH BLOCK: ICD-10-CM

## 2024-09-09 DIAGNOSIS — Z87.891 PERSONAL HISTORY OF NICOTINE DEPENDENCE: ICD-10-CM

## 2024-09-09 DIAGNOSIS — L85.3 XEROSIS CUTIS: ICD-10-CM

## 2024-09-09 DIAGNOSIS — E43 UNSPECIFIED SEVERE PROTEIN-CALORIE MALNUTRITION: ICD-10-CM

## 2024-09-09 DIAGNOSIS — L89.109 PRESSURE ULCER OF UNSPECIFIED PART OF BACK, UNSPECIFIED STAGE: ICD-10-CM

## 2024-09-09 DIAGNOSIS — L60.3 NAIL DYSTROPHY: ICD-10-CM

## 2024-09-09 DIAGNOSIS — L89.152 PRESSURE ULCER OF SACRAL REGION, STAGE 2: ICD-10-CM

## 2024-09-09 DIAGNOSIS — J96.01 ACUTE RESPIRATORY FAILURE WITH HYPOXIA: ICD-10-CM

## 2024-09-09 DIAGNOSIS — A41.9 SEPSIS, UNSPECIFIED ORGANISM: ICD-10-CM

## 2024-09-09 DIAGNOSIS — D84.9 IMMUNODEFICIENCY, UNSPECIFIED: ICD-10-CM

## 2024-09-09 DIAGNOSIS — J18.9 PNEUMONIA, UNSPECIFIED ORGANISM: ICD-10-CM

## 2024-09-09 PROBLEM — Z00.00 ENCOUNTER FOR PREVENTIVE HEALTH EXAMINATION: Status: ACTIVE | Noted: 2024-09-09
